# Patient Record
Sex: MALE | Race: WHITE | NOT HISPANIC OR LATINO | URBAN - METROPOLITAN AREA
[De-identification: names, ages, dates, MRNs, and addresses within clinical notes are randomized per-mention and may not be internally consistent; named-entity substitution may affect disease eponyms.]

---

## 2017-11-03 ENCOUNTER — EMERGENCY (EMERGENCY)
Facility: HOSPITAL | Age: 57
LOS: 0 days | Discharge: HOME | End: 2017-11-03
Attending: STUDENT IN AN ORGANIZED HEALTH CARE EDUCATION/TRAINING PROGRAM
Payer: MEDICARE

## 2017-11-03 VITALS
HEART RATE: 79 BPM | RESPIRATION RATE: 20 BRPM | DIASTOLIC BLOOD PRESSURE: 68 MMHG | SYSTOLIC BLOOD PRESSURE: 108 MMHG | TEMPERATURE: 98 F | OXYGEN SATURATION: 96 %

## 2017-11-03 VITALS
HEIGHT: 67 IN | RESPIRATION RATE: 18 BRPM | HEART RATE: 88 BPM | TEMPERATURE: 98 F | DIASTOLIC BLOOD PRESSURE: 75 MMHG | WEIGHT: 220.02 LBS | OXYGEN SATURATION: 99 % | SYSTOLIC BLOOD PRESSURE: 109 MMHG

## 2017-11-03 LAB
ALBUMIN SERPL ELPH-MCNC: 4.1 G/DL — SIGNIFICANT CHANGE UP (ref 3.3–5)
ALP SERPL-CCNC: 72 U/L — SIGNIFICANT CHANGE UP (ref 40–120)
ALT FLD-CCNC: 42 U/L — SIGNIFICANT CHANGE UP (ref 12–78)
ANION GAP SERPL CALC-SCNC: 12 MMOL/L — SIGNIFICANT CHANGE UP (ref 5–17)
APTT BLD: 30.6 SEC — SIGNIFICANT CHANGE UP (ref 27.5–37.4)
AST SERPL-CCNC: 33 U/L — SIGNIFICANT CHANGE UP (ref 15–37)
BASOPHILS # BLD AUTO: 0 K/UL — SIGNIFICANT CHANGE UP (ref 0–0.2)
BASOPHILS NFR BLD AUTO: 0.6 % — SIGNIFICANT CHANGE UP (ref 0–2)
BILIRUB SERPL-MCNC: 0.5 MG/DL — SIGNIFICANT CHANGE UP (ref 0.2–1.2)
BUN SERPL-MCNC: 7 MG/DL — SIGNIFICANT CHANGE UP (ref 7–23)
CALCIUM SERPL-MCNC: 8.8 MG/DL — SIGNIFICANT CHANGE UP (ref 8.5–10.1)
CHLORIDE SERPL-SCNC: 94 MMOL/L — LOW (ref 96–108)
CK MB BLD-MCNC: 1.6 % — SIGNIFICANT CHANGE UP (ref 0–3.5)
CK MB BLD-MCNC: 2.1 % — SIGNIFICANT CHANGE UP (ref 0–3.5)
CK MB CFR SERPL CALC: 4.7 NG/ML — HIGH (ref 0.5–3.6)
CK MB CFR SERPL CALC: 7.1 NG/ML — HIGH (ref 0.5–3.6)
CK SERPL-CCNC: 298 U/L — SIGNIFICANT CHANGE UP (ref 26–308)
CK SERPL-CCNC: 335 U/L — HIGH (ref 26–308)
CO2 SERPL-SCNC: 24 MMOL/L — SIGNIFICANT CHANGE UP (ref 22–31)
CREAT SERPL-MCNC: 0.7 MG/DL — SIGNIFICANT CHANGE UP (ref 0.5–1.3)
EOSINOPHIL # BLD AUTO: 0 K/UL — SIGNIFICANT CHANGE UP (ref 0–0.5)
EOSINOPHIL NFR BLD AUTO: 0.7 % — SIGNIFICANT CHANGE UP (ref 0–6)
GLUCOSE SERPL-MCNC: 119 MG/DL — HIGH (ref 70–99)
HCT VFR BLD CALC: 37.2 % — LOW (ref 39–50)
HGB BLD-MCNC: 13.4 G/DL — SIGNIFICANT CHANGE UP (ref 13–17)
INR BLD: 1 RATIO — SIGNIFICANT CHANGE UP (ref 0.88–1.16)
LYMPHOCYTES # BLD AUTO: 1.4 K/UL — SIGNIFICANT CHANGE UP (ref 1–3.3)
LYMPHOCYTES # BLD AUTO: 21 % — SIGNIFICANT CHANGE UP (ref 13–44)
MCHC RBC-ENTMCNC: 31.5 PG — SIGNIFICANT CHANGE UP (ref 27–34)
MCHC RBC-ENTMCNC: 36 GM/DL — SIGNIFICANT CHANGE UP (ref 32–36)
MCV RBC AUTO: 87.7 FL — SIGNIFICANT CHANGE UP (ref 80–100)
MONOCYTES # BLD AUTO: 0.4 K/UL — SIGNIFICANT CHANGE UP (ref 0–0.9)
MONOCYTES NFR BLD AUTO: 6.5 % — SIGNIFICANT CHANGE UP (ref 2–14)
NEUTROPHILS # BLD AUTO: 4.9 K/UL — SIGNIFICANT CHANGE UP (ref 1.8–7.4)
NEUTROPHILS NFR BLD AUTO: 71.1 % — SIGNIFICANT CHANGE UP (ref 43–77)
PLATELET # BLD AUTO: 188 K/UL — SIGNIFICANT CHANGE UP (ref 150–400)
POTASSIUM SERPL-MCNC: 3.9 MMOL/L — SIGNIFICANT CHANGE UP (ref 3.5–5.3)
POTASSIUM SERPL-SCNC: 3.9 MMOL/L — SIGNIFICANT CHANGE UP (ref 3.5–5.3)
PROT SERPL-MCNC: 7 GM/DL — SIGNIFICANT CHANGE UP (ref 6–8.3)
PROTHROM AB SERPL-ACNC: 10.9 SEC — SIGNIFICANT CHANGE UP (ref 9.8–12.7)
RBC # BLD: 4.24 M/UL — SIGNIFICANT CHANGE UP (ref 4.2–5.8)
RBC # FLD: 11.8 % — SIGNIFICANT CHANGE UP (ref 11–15)
SODIUM SERPL-SCNC: 130 MMOL/L — LOW (ref 135–145)
TROPONIN I SERPL-MCNC: <.015 NG/ML — SIGNIFICANT CHANGE UP (ref 0.01–0.04)
TROPONIN I SERPL-MCNC: <.015 NG/ML — SIGNIFICANT CHANGE UP (ref 0.01–0.04)
WBC # BLD: 6.8 K/UL — SIGNIFICANT CHANGE UP (ref 3.8–10.5)
WBC # FLD AUTO: 6.8 K/UL — SIGNIFICANT CHANGE UP (ref 3.8–10.5)

## 2017-11-03 PROCEDURE — 93010 ELECTROCARDIOGRAM REPORT: CPT

## 2017-11-03 PROCEDURE — 99284 EMERGENCY DEPT VISIT MOD MDM: CPT

## 2017-11-03 PROCEDURE — 71010: CPT | Mod: 26

## 2017-11-03 RX ORDER — NITROGLYCERIN 6.5 MG
0.4 CAPSULE, EXTENDED RELEASE ORAL
Qty: 0 | Refills: 0 | Status: DISCONTINUED | OUTPATIENT
Start: 2017-11-03 | End: 2017-11-03

## 2017-11-03 RX ORDER — ASPIRIN/CALCIUM CARB/MAGNESIUM 324 MG
162 TABLET ORAL ONCE
Qty: 0 | Refills: 0 | Status: COMPLETED | OUTPATIENT
Start: 2017-11-03 | End: 2017-11-03

## 2017-11-03 RX ORDER — SODIUM CHLORIDE 9 MG/ML
3 INJECTION INTRAMUSCULAR; INTRAVENOUS; SUBCUTANEOUS ONCE
Qty: 0 | Refills: 0 | Status: COMPLETED | OUTPATIENT
Start: 2017-11-03 | End: 2017-11-03

## 2017-11-03 RX ORDER — ASPIRIN/CALCIUM CARB/MAGNESIUM 324 MG
162 TABLET ORAL ONCE
Qty: 0 | Refills: 0 | Status: DISCONTINUED | OUTPATIENT
Start: 2017-11-03 | End: 2017-11-03

## 2017-11-03 RX ORDER — SODIUM CHLORIDE 9 MG/ML
1000 INJECTION INTRAMUSCULAR; INTRAVENOUS; SUBCUTANEOUS
Qty: 0 | Refills: 0 | Status: DISCONTINUED | OUTPATIENT
Start: 2017-11-03 | End: 2017-11-03

## 2017-11-03 RX ORDER — CLONAZEPAM 1 MG
2 TABLET ORAL ONCE
Qty: 0 | Refills: 0 | Status: DISCONTINUED | OUTPATIENT
Start: 2017-11-03 | End: 2017-11-03

## 2017-11-03 RX ADMIN — Medication 0.4 MILLIGRAM(S): at 10:25

## 2017-11-03 RX ADMIN — Medication 2 MILLIGRAM(S): at 11:51

## 2017-11-03 RX ADMIN — Medication 0.4 MILLIGRAM(S): at 11:16

## 2017-11-03 RX ADMIN — SODIUM CHLORIDE 3 MILLILITER(S): 9 INJECTION INTRAMUSCULAR; INTRAVENOUS; SUBCUTANEOUS at 10:38

## 2017-11-03 RX ADMIN — Medication 162 MILLIGRAM(S): at 10:38

## 2017-11-03 RX ADMIN — SODIUM CHLORIDE 75 MILLILITER(S): 9 INJECTION INTRAMUSCULAR; INTRAVENOUS; SUBCUTANEOUS at 10:46

## 2017-11-03 NOTE — ED PROVIDER NOTE - PROGRESS NOTE DETAILS
pt's PMD (Dr. 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

## 2017-11-03 NOTE — ED ADULT TRIAGE NOTE - CHIEF COMPLAINT QUOTE
ems states, " Pt was picked up from outpatient psych c/o chest pain while sitting in chair this morning, no radiation, 325mg asa given , and sublingual nitro " ekg N/S by ems

## 2017-11-03 NOTE — ED ADULT NURSE NOTE - OBJECTIVE STATEMENT
pt c/o left side chest pain nonradiating while at psychiatrist office. pt also c/o sob, dizziness, given nitro SL x1, asp x 2 by emt.

## 2017-11-03 NOTE — ED PROVIDER NOTE - OBJECTIVE STATEMENT
56 year old male with h/o HTN, DM, HLD, COPD ОЛЬГА, anxiety, schizophrenia, CAD (s/p cardiac stents x4 two years ago), PPM (placed two years ago and Tourettes presents today BIBA for chest pain, pt states that he just left his psychiatrist when he began to feel left sided nonradiating squeezing/ pressure pain rated 8-9/10 associated with nausea and lightheadedness (-) vomiting (-) sob (-) palpitations (-) weakness

## 2017-11-03 NOTE — ED PROVIDER NOTE - PMH
Anxiety    Chronic bronchitis    COPD (chronic obstructive pulmonary disease)    DM (diabetes mellitus)  resolved with weight loss  GERD (gastroesophageal reflux disease)    HLD (hyperlipidemia)    Hypertriglyceridemia    ОЛЬГА (obstructive sleep apnea)    Schizophrenia    Smoker    Smoker    Tourettes syndrome

## 2017-11-06 DIAGNOSIS — F17.200 NICOTINE DEPENDENCE, UNSPECIFIED, UNCOMPLICATED: ICD-10-CM

## 2017-11-06 DIAGNOSIS — R07.9 CHEST PAIN, UNSPECIFIED: ICD-10-CM

## 2017-11-06 DIAGNOSIS — F41.9 ANXIETY DISORDER, UNSPECIFIED: ICD-10-CM

## 2017-11-06 DIAGNOSIS — G47.33 OBSTRUCTIVE SLEEP APNEA (ADULT) (PEDIATRIC): ICD-10-CM

## 2017-11-06 DIAGNOSIS — E78.5 HYPERLIPIDEMIA, UNSPECIFIED: ICD-10-CM

## 2017-11-06 DIAGNOSIS — F20.9 SCHIZOPHRENIA, UNSPECIFIED: ICD-10-CM

## 2017-11-06 DIAGNOSIS — E11.9 TYPE 2 DIABETES MELLITUS WITHOUT COMPLICATIONS: ICD-10-CM

## 2017-11-06 DIAGNOSIS — E78.1 PURE HYPERGLYCERIDEMIA: ICD-10-CM

## 2017-11-06 DIAGNOSIS — F95.2 TOURETTE'S DISORDER: ICD-10-CM

## 2018-04-01 ENCOUNTER — OUTPATIENT (OUTPATIENT)
Dept: OUTPATIENT SERVICES | Facility: HOSPITAL | Age: 58
LOS: 1 days | End: 2018-04-01
Payer: MEDICAID

## 2018-04-01 PROCEDURE — G9001: CPT

## 2018-04-03 ENCOUNTER — EMERGENCY (EMERGENCY)
Facility: HOSPITAL | Age: 58
LOS: 0 days | Discharge: HOME | End: 2018-04-03
Attending: STUDENT IN AN ORGANIZED HEALTH CARE EDUCATION/TRAINING PROGRAM
Payer: MEDICARE

## 2018-04-03 VITALS
OXYGEN SATURATION: 97 % | DIASTOLIC BLOOD PRESSURE: 87 MMHG | TEMPERATURE: 98 F | HEART RATE: 112 BPM | SYSTOLIC BLOOD PRESSURE: 151 MMHG | RESPIRATION RATE: 17 BRPM

## 2018-04-03 VITALS
RESPIRATION RATE: 16 BRPM | SYSTOLIC BLOOD PRESSURE: 140 MMHG | HEART RATE: 80 BPM | HEIGHT: 67 IN | TEMPERATURE: 98 F | OXYGEN SATURATION: 99 % | WEIGHT: 220.02 LBS | DIASTOLIC BLOOD PRESSURE: 85 MMHG

## 2018-04-03 DIAGNOSIS — Z79.82 LONG TERM (CURRENT) USE OF ASPIRIN: ICD-10-CM

## 2018-04-03 DIAGNOSIS — E11.9 TYPE 2 DIABETES MELLITUS WITHOUT COMPLICATIONS: ICD-10-CM

## 2018-04-03 DIAGNOSIS — F20.9 SCHIZOPHRENIA, UNSPECIFIED: ICD-10-CM

## 2018-04-03 DIAGNOSIS — R06.02 SHORTNESS OF BREATH: ICD-10-CM

## 2018-04-03 DIAGNOSIS — J44.9 CHRONIC OBSTRUCTIVE PULMONARY DISEASE, UNSPECIFIED: ICD-10-CM

## 2018-04-03 DIAGNOSIS — K21.9 GASTRO-ESOPHAGEAL REFLUX DISEASE WITHOUT ESOPHAGITIS: ICD-10-CM

## 2018-04-03 DIAGNOSIS — R07.89 OTHER CHEST PAIN: ICD-10-CM

## 2018-04-03 DIAGNOSIS — R10.13 EPIGASTRIC PAIN: ICD-10-CM

## 2018-04-03 DIAGNOSIS — F95.2 TOURETTE'S DISORDER: ICD-10-CM

## 2018-04-03 DIAGNOSIS — Z87.891 PERSONAL HISTORY OF NICOTINE DEPENDENCE: ICD-10-CM

## 2018-04-03 DIAGNOSIS — F41.9 ANXIETY DISORDER, UNSPECIFIED: ICD-10-CM

## 2018-04-03 DIAGNOSIS — E78.5 HYPERLIPIDEMIA, UNSPECIFIED: ICD-10-CM

## 2018-04-03 DIAGNOSIS — G47.33 OBSTRUCTIVE SLEEP APNEA (ADULT) (PEDIATRIC): ICD-10-CM

## 2018-04-03 DIAGNOSIS — E78.1 PURE HYPERGLYCERIDEMIA: ICD-10-CM

## 2018-04-03 DIAGNOSIS — R11.0 NAUSEA: ICD-10-CM

## 2018-04-03 LAB
ALBUMIN SERPL ELPH-MCNC: 4.2 G/DL — SIGNIFICANT CHANGE UP (ref 3.3–5)
ALP SERPL-CCNC: 70 U/L — SIGNIFICANT CHANGE UP (ref 40–120)
ALT FLD-CCNC: 28 U/L — SIGNIFICANT CHANGE UP (ref 12–78)
ANION GAP SERPL CALC-SCNC: 9 MMOL/L — SIGNIFICANT CHANGE UP (ref 5–17)
APTT BLD: 29.1 SEC — SIGNIFICANT CHANGE UP (ref 27.5–37.4)
AST SERPL-CCNC: 28 U/L — SIGNIFICANT CHANGE UP (ref 15–37)
BASOPHILS # BLD AUTO: 0.02 K/UL — SIGNIFICANT CHANGE UP (ref 0–0.2)
BASOPHILS NFR BLD AUTO: 0.2 % — SIGNIFICANT CHANGE UP (ref 0–2)
BILIRUB SERPL-MCNC: 0.4 MG/DL — SIGNIFICANT CHANGE UP (ref 0.2–1.2)
BUN SERPL-MCNC: 3 MG/DL — LOW (ref 7–23)
CALCIUM SERPL-MCNC: 9 MG/DL — SIGNIFICANT CHANGE UP (ref 8.5–10.1)
CHLORIDE SERPL-SCNC: 104 MMOL/L — SIGNIFICANT CHANGE UP (ref 96–108)
CK MB BLD-MCNC: 1.9 % — SIGNIFICANT CHANGE UP (ref 0–3.5)
CK MB BLD-MCNC: 2.1 % — SIGNIFICANT CHANGE UP (ref 0–3.5)
CK MB CFR SERPL CALC: 4.3 NG/ML — HIGH (ref 0.5–3.6)
CK MB CFR SERPL CALC: 5.7 NG/ML — HIGH (ref 0.5–3.6)
CK SERPL-CCNC: 224 U/L — SIGNIFICANT CHANGE UP (ref 26–308)
CK SERPL-CCNC: 269 U/L — SIGNIFICANT CHANGE UP (ref 26–308)
CO2 SERPL-SCNC: 24 MMOL/L — SIGNIFICANT CHANGE UP (ref 22–31)
CREAT SERPL-MCNC: 0.61 MG/DL — SIGNIFICANT CHANGE UP (ref 0.5–1.3)
EOSINOPHIL # BLD AUTO: 0.01 K/UL — SIGNIFICANT CHANGE UP (ref 0–0.5)
EOSINOPHIL NFR BLD AUTO: 0.1 % — SIGNIFICANT CHANGE UP (ref 0–6)
GLUCOSE SERPL-MCNC: 115 MG/DL — HIGH (ref 70–99)
HCT VFR BLD CALC: 39.8 % — SIGNIFICANT CHANGE UP (ref 39–50)
HGB BLD-MCNC: 14.2 G/DL — SIGNIFICANT CHANGE UP (ref 13–17)
IMM GRANULOCYTES NFR BLD AUTO: 0.2 % — SIGNIFICANT CHANGE UP (ref 0–1.5)
INR BLD: 1.12 RATIO — SIGNIFICANT CHANGE UP (ref 0.88–1.16)
LACTATE SERPL-SCNC: 1.7 MMOL/L — SIGNIFICANT CHANGE UP (ref 0.7–2)
LYMPHOCYTES # BLD AUTO: 1.34 K/UL — SIGNIFICANT CHANGE UP (ref 1–3.3)
LYMPHOCYTES # BLD AUTO: 15.5 % — SIGNIFICANT CHANGE UP (ref 13–44)
MCHC RBC-ENTMCNC: 31.4 PG — SIGNIFICANT CHANGE UP (ref 27–34)
MCHC RBC-ENTMCNC: 35.7 GM/DL — SIGNIFICANT CHANGE UP (ref 32–36)
MCV RBC AUTO: 88.1 FL — SIGNIFICANT CHANGE UP (ref 80–100)
MONOCYTES # BLD AUTO: 0.47 K/UL — SIGNIFICANT CHANGE UP (ref 0–0.9)
MONOCYTES NFR BLD AUTO: 5.4 % — SIGNIFICANT CHANGE UP (ref 2–14)
NEUTROPHILS # BLD AUTO: 6.81 K/UL — SIGNIFICANT CHANGE UP (ref 1.8–7.4)
NEUTROPHILS NFR BLD AUTO: 78.6 % — HIGH (ref 43–77)
NRBC # BLD: 0 /100 WBCS — SIGNIFICANT CHANGE UP (ref 0–0)
PLATELET # BLD AUTO: 156 K/UL — SIGNIFICANT CHANGE UP (ref 150–400)
POTASSIUM SERPL-MCNC: 3.5 MMOL/L — SIGNIFICANT CHANGE UP (ref 3.5–5.3)
POTASSIUM SERPL-SCNC: 3.5 MMOL/L — SIGNIFICANT CHANGE UP (ref 3.5–5.3)
PROT SERPL-MCNC: 7.4 GM/DL — SIGNIFICANT CHANGE UP (ref 6–8.3)
PROTHROM AB SERPL-ACNC: 12.2 SEC — SIGNIFICANT CHANGE UP (ref 9.8–12.7)
RBC # BLD: 4.52 M/UL — SIGNIFICANT CHANGE UP (ref 4.2–5.8)
RBC # FLD: 13.8 % — SIGNIFICANT CHANGE UP (ref 10.3–14.5)
SODIUM SERPL-SCNC: 137 MMOL/L — SIGNIFICANT CHANGE UP (ref 135–145)
TROPONIN I SERPL-MCNC: <.015 NG/ML — SIGNIFICANT CHANGE UP (ref 0.01–0.04)
TROPONIN I SERPL-MCNC: <.015 NG/ML — SIGNIFICANT CHANGE UP (ref 0.01–0.04)
WBC # BLD: 8.67 K/UL — SIGNIFICANT CHANGE UP (ref 3.8–10.5)
WBC # FLD AUTO: 8.67 K/UL — SIGNIFICANT CHANGE UP (ref 3.8–10.5)

## 2018-04-03 PROCEDURE — 71045 X-RAY EXAM CHEST 1 VIEW: CPT | Mod: 26

## 2018-04-03 PROCEDURE — 74177 CT ABD & PELVIS W/CONTRAST: CPT | Mod: 26

## 2018-04-03 PROCEDURE — 93010 ELECTROCARDIOGRAM REPORT: CPT

## 2018-04-03 PROCEDURE — 99284 EMERGENCY DEPT VISIT MOD MDM: CPT

## 2018-04-03 RX ORDER — CLONAZEPAM 1 MG
2 TABLET ORAL ONCE
Qty: 0 | Refills: 0 | Status: DISCONTINUED | OUTPATIENT
Start: 2018-04-03 | End: 2018-04-03

## 2018-04-03 RX ORDER — ONDANSETRON 8 MG/1
8 TABLET, FILM COATED ORAL ONCE
Qty: 0 | Refills: 0 | Status: COMPLETED | OUTPATIENT
Start: 2018-04-03 | End: 2018-04-03

## 2018-04-03 RX ORDER — SODIUM CHLORIDE 9 MG/ML
3 INJECTION INTRAMUSCULAR; INTRAVENOUS; SUBCUTANEOUS ONCE
Qty: 0 | Refills: 0 | Status: COMPLETED | OUTPATIENT
Start: 2018-04-03 | End: 2018-04-03

## 2018-04-03 RX ADMIN — ONDANSETRON 8 MILLIGRAM(S): 8 TABLET, FILM COATED ORAL at 13:32

## 2018-04-03 RX ADMIN — Medication 2 MILLIGRAM(S): at 11:45

## 2018-04-03 RX ADMIN — SODIUM CHLORIDE 3 MILLILITER(S): 9 INJECTION INTRAMUSCULAR; INTRAVENOUS; SUBCUTANEOUS at 11:02

## 2018-04-03 NOTE — ED PROVIDER NOTE - PROGRESS NOTE DETAILS
dr noland (pt's pmd) did call in and updated on his labs and results, wants pt to follow up with him in the office

## 2018-04-03 NOTE — ED PROVIDER NOTE - OBJECTIVE STATEMENT
57 year old male presents today c/o two days of chest pain which he describes as a left sided, intermittent, nonradiating pain rated 7/10 associated with nausea, sob and palpitations, he was given full dose aspirin by ems, in the ER pt's pain has improved, he does not appear sob , denies palpitations (-) leg edema (-) recent travel

## 2018-04-16 ENCOUNTER — INPATIENT (INPATIENT)
Facility: HOSPITAL | Age: 58
LOS: 1 days | Discharge: HOME | End: 2018-04-18
Attending: HOSPITALIST | Admitting: HOSPITALIST
Payer: MEDICARE

## 2018-04-16 VITALS
OXYGEN SATURATION: 99 % | WEIGHT: 220.02 LBS | HEART RATE: 70 BPM | DIASTOLIC BLOOD PRESSURE: 71 MMHG | TEMPERATURE: 98 F | RESPIRATION RATE: 17 BRPM | SYSTOLIC BLOOD PRESSURE: 157 MMHG | HEIGHT: 67 IN

## 2018-04-16 DIAGNOSIS — R07.2 PRECORDIAL PAIN: ICD-10-CM

## 2018-04-16 DIAGNOSIS — F41.9 ANXIETY DISORDER, UNSPECIFIED: ICD-10-CM

## 2018-04-16 DIAGNOSIS — R69 ILLNESS, UNSPECIFIED: ICD-10-CM

## 2018-04-16 DIAGNOSIS — F20.9 SCHIZOPHRENIA, UNSPECIFIED: ICD-10-CM

## 2018-04-16 DIAGNOSIS — K92.2 GASTROINTESTINAL HEMORRHAGE, UNSPECIFIED: ICD-10-CM

## 2018-04-16 DIAGNOSIS — E78.1 PURE HYPERGLYCERIDEMIA: ICD-10-CM

## 2018-04-16 LAB
ALBUMIN SERPL ELPH-MCNC: 4 G/DL — SIGNIFICANT CHANGE UP (ref 3.3–5)
ALP SERPL-CCNC: 70 U/L — SIGNIFICANT CHANGE UP (ref 40–120)
ALT FLD-CCNC: 24 U/L — SIGNIFICANT CHANGE UP (ref 12–78)
ANION GAP SERPL CALC-SCNC: 9 MMOL/L — SIGNIFICANT CHANGE UP (ref 5–17)
APTT BLD: 29.6 SEC — SIGNIFICANT CHANGE UP (ref 27.5–37.4)
AST SERPL-CCNC: 23 U/L — SIGNIFICANT CHANGE UP (ref 15–37)
BASOPHILS # BLD AUTO: 0.02 K/UL — SIGNIFICANT CHANGE UP (ref 0–0.2)
BASOPHILS NFR BLD AUTO: 0.3 % — SIGNIFICANT CHANGE UP (ref 0–2)
BILIRUB SERPL-MCNC: 0.3 MG/DL — SIGNIFICANT CHANGE UP (ref 0.2–1.2)
BLD GP AB SCN SERPL QL: SIGNIFICANT CHANGE UP
BUN SERPL-MCNC: 4 MG/DL — LOW (ref 7–23)
CALCIUM SERPL-MCNC: 8.5 MG/DL — SIGNIFICANT CHANGE UP (ref 8.5–10.1)
CHLORIDE SERPL-SCNC: 105 MMOL/L — SIGNIFICANT CHANGE UP (ref 96–108)
CK MB BLD-MCNC: 2.4 % — SIGNIFICANT CHANGE UP (ref 0–3.5)
CK MB CFR SERPL CALC: 3.4 NG/ML — SIGNIFICANT CHANGE UP (ref 0.5–3.6)
CK SERPL-CCNC: 141 U/L — SIGNIFICANT CHANGE UP (ref 26–308)
CO2 SERPL-SCNC: 25 MMOL/L — SIGNIFICANT CHANGE UP (ref 22–31)
CREAT SERPL-MCNC: 0.74 MG/DL — SIGNIFICANT CHANGE UP (ref 0.5–1.3)
EOSINOPHIL # BLD AUTO: 0.04 K/UL — SIGNIFICANT CHANGE UP (ref 0–0.5)
EOSINOPHIL NFR BLD AUTO: 0.6 % — SIGNIFICANT CHANGE UP (ref 0–6)
GLUCOSE SERPL-MCNC: 116 MG/DL — HIGH (ref 70–99)
HCT VFR BLD CALC: 39.2 % — SIGNIFICANT CHANGE UP (ref 39–50)
HGB BLD-MCNC: 13.2 G/DL — SIGNIFICANT CHANGE UP (ref 13–17)
IMM GRANULOCYTES NFR BLD AUTO: 0.2 % — SIGNIFICANT CHANGE UP (ref 0–1.5)
INR BLD: 1.04 RATIO — SIGNIFICANT CHANGE UP (ref 0.88–1.16)
LYMPHOCYTES # BLD AUTO: 1.05 K/UL — SIGNIFICANT CHANGE UP (ref 1–3.3)
LYMPHOCYTES # BLD AUTO: 15.8 % — SIGNIFICANT CHANGE UP (ref 13–44)
MCHC RBC-ENTMCNC: 30.9 PG — SIGNIFICANT CHANGE UP (ref 27–34)
MCHC RBC-ENTMCNC: 33.7 GM/DL — SIGNIFICANT CHANGE UP (ref 32–36)
MCV RBC AUTO: 91.8 FL — SIGNIFICANT CHANGE UP (ref 80–100)
MONOCYTES # BLD AUTO: 0.25 K/UL — SIGNIFICANT CHANGE UP (ref 0–0.9)
MONOCYTES NFR BLD AUTO: 3.8 % — SIGNIFICANT CHANGE UP (ref 2–14)
NEUTROPHILS # BLD AUTO: 5.29 K/UL — SIGNIFICANT CHANGE UP (ref 1.8–7.4)
NEUTROPHILS NFR BLD AUTO: 79.3 % — HIGH (ref 43–77)
NRBC # BLD: 0 /100 WBCS — SIGNIFICANT CHANGE UP (ref 0–0)
OB PNL STL: NEGATIVE — SIGNIFICANT CHANGE UP
PLATELET # BLD AUTO: 142 K/UL — LOW (ref 150–400)
POTASSIUM SERPL-MCNC: 3.6 MMOL/L — SIGNIFICANT CHANGE UP (ref 3.5–5.3)
POTASSIUM SERPL-SCNC: 3.6 MMOL/L — SIGNIFICANT CHANGE UP (ref 3.5–5.3)
PROT SERPL-MCNC: 6.6 GM/DL — SIGNIFICANT CHANGE UP (ref 6–8.3)
PROTHROM AB SERPL-ACNC: 11.4 SEC — SIGNIFICANT CHANGE UP (ref 9.8–12.7)
RBC # BLD: 4.27 M/UL — SIGNIFICANT CHANGE UP (ref 4.2–5.8)
RBC # FLD: 13.7 % — SIGNIFICANT CHANGE UP (ref 10.3–14.5)
SODIUM SERPL-SCNC: 139 MMOL/L — SIGNIFICANT CHANGE UP (ref 135–145)
TROPONIN I SERPL-MCNC: <.015 NG/ML — SIGNIFICANT CHANGE UP (ref 0.01–0.04)
WBC # BLD: 6.66 K/UL — SIGNIFICANT CHANGE UP (ref 3.8–10.5)
WBC # FLD AUTO: 6.66 K/UL — SIGNIFICANT CHANGE UP (ref 3.8–10.5)

## 2018-04-16 PROCEDURE — 99285 EMERGENCY DEPT VISIT HI MDM: CPT

## 2018-04-16 PROCEDURE — 93010 ELECTROCARDIOGRAM REPORT: CPT

## 2018-04-16 PROCEDURE — 71045 X-RAY EXAM CHEST 1 VIEW: CPT | Mod: 26

## 2018-04-16 PROCEDURE — 99223 1ST HOSP IP/OBS HIGH 75: CPT

## 2018-04-16 RX ORDER — CITALOPRAM 10 MG/1
40 TABLET, FILM COATED ORAL DAILY
Qty: 0 | Refills: 0 | Status: DISCONTINUED | OUTPATIENT
Start: 2018-04-16 | End: 2018-04-18

## 2018-04-16 RX ORDER — ONDANSETRON 8 MG/1
8 TABLET, FILM COATED ORAL ONCE
Qty: 0 | Refills: 0 | Status: COMPLETED | OUTPATIENT
Start: 2018-04-16 | End: 2018-04-16

## 2018-04-16 RX ORDER — INFLUENZA VIRUS VACCINE 15; 15; 15; 15 UG/.5ML; UG/.5ML; UG/.5ML; UG/.5ML
0.5 SUSPENSION INTRAMUSCULAR ONCE
Qty: 0 | Refills: 0 | Status: COMPLETED | OUTPATIENT
Start: 2018-04-16 | End: 2018-04-18

## 2018-04-16 RX ORDER — ONDANSETRON 8 MG/1
4 TABLET, FILM COATED ORAL EVERY 6 HOURS
Qty: 0 | Refills: 0 | Status: DISCONTINUED | OUTPATIENT
Start: 2018-04-16 | End: 2018-04-18

## 2018-04-16 RX ORDER — CLONAZEPAM 1 MG
2 TABLET ORAL THREE TIMES A DAY
Qty: 0 | Refills: 0 | Status: DISCONTINUED | OUTPATIENT
Start: 2018-04-16 | End: 2018-04-18

## 2018-04-16 RX ORDER — RISPERIDONE 4 MG/1
2 TABLET ORAL AT BEDTIME
Qty: 0 | Refills: 0 | Status: DISCONTINUED | OUTPATIENT
Start: 2018-04-16 | End: 2018-04-18

## 2018-04-16 RX ORDER — TICAGRELOR 90 MG/1
90 TABLET ORAL
Qty: 0 | Refills: 0 | Status: DISCONTINUED | OUTPATIENT
Start: 2018-04-16 | End: 2018-04-18

## 2018-04-16 RX ORDER — PANTOPRAZOLE SODIUM 20 MG/1
40 TABLET, DELAYED RELEASE ORAL EVERY 12 HOURS
Qty: 0 | Refills: 0 | Status: DISCONTINUED | OUTPATIENT
Start: 2018-04-16 | End: 2018-04-18

## 2018-04-16 RX ORDER — ISOSORBIDE MONONITRATE 60 MG/1
60 TABLET, EXTENDED RELEASE ORAL DAILY
Qty: 0 | Refills: 0 | Status: DISCONTINUED | OUTPATIENT
Start: 2018-04-16 | End: 2018-04-18

## 2018-04-16 RX ORDER — ATORVASTATIN CALCIUM 80 MG/1
40 TABLET, FILM COATED ORAL AT BEDTIME
Qty: 0 | Refills: 0 | Status: DISCONTINUED | OUTPATIENT
Start: 2018-04-16 | End: 2018-04-18

## 2018-04-16 RX ADMIN — RISPERIDONE 2 MILLIGRAM(S): 4 TABLET ORAL at 22:53

## 2018-04-16 RX ADMIN — PANTOPRAZOLE SODIUM 40 MILLIGRAM(S): 20 TABLET, DELAYED RELEASE ORAL at 18:08

## 2018-04-16 RX ADMIN — Medication 10 MILLIGRAM(S): at 14:00

## 2018-04-16 RX ADMIN — Medication 2 MILLIGRAM(S): at 21:47

## 2018-04-16 RX ADMIN — ONDANSETRON 4 MILLIGRAM(S): 8 TABLET, FILM COATED ORAL at 14:10

## 2018-04-16 RX ADMIN — Medication 2 MILLIGRAM(S): at 13:58

## 2018-04-16 RX ADMIN — ATORVASTATIN CALCIUM 40 MILLIGRAM(S): 80 TABLET, FILM COATED ORAL at 21:47

## 2018-04-16 RX ADMIN — TICAGRELOR 90 MILLIGRAM(S): 90 TABLET ORAL at 18:08

## 2018-04-16 RX ADMIN — ONDANSETRON 8 MILLIGRAM(S): 8 TABLET, FILM COATED ORAL at 09:11

## 2018-04-16 NOTE — H&P ADULT - PROBLEM SELECTOR PLAN 2
telemetry  cardiology eval   continue brilinta in spite of gastroenterologist bleed as guiac NEGATIVE and hgb within normal limit

## 2018-04-16 NOTE — ED PROVIDER NOTE - GASTROINTESTINAL, MLM
Abdomen soft, non-tender, no guarding. dark stool noted on guaiac - negative on lab testing for blood

## 2018-04-16 NOTE — ED ADULT TRIAGE NOTE - CHIEF COMPLAINT QUOTE
vomiting blood with dark tarry stools since Saturday with chest pressure on and off   history of 4 cardiac stents with pacemaker

## 2018-04-16 NOTE — H&P ADULT - NSHPPHYSICALEXAM_GEN_ALL_CORE
GENERAL: NAD well-developed  HEAD:  Atraumatic, Normocephalic  EYES: EOMI, PERRLA, conjunctiva and sclera clear  ENMT: No tonsillar erythema, exudates, or enlargement; Moist mucous membranes, Good dentition, No lesions  NECK: Supple, No JVD, Normal thyroid  NERVOUS SYSTEM:  Alert & Oriented X3, Good concentration; Motor Strength 5/5 B/L upper and lower extremities; DTRs 2+ intact and symmetric  CHEST/LUNG: Clear to percussion bilaterally; No rales, rhonchi, wheezing, or rubs  HEART: Regular rate and rhythm; No murmurs, rubs, or gallops  ABDOMEN: Soft, Nontender, Nondistended; Bowel sounds present  EXTREMITIES:  2+ Peripheral Pulses, No clubbing, cyanosis, or edema  LYMPH: No lymphadenopathy   SKIN: No rashes or lesions GENERAL: NAD well-developed  HEAD:  Atraumatic, Normocephalic  EYES: EOMI, PERRLA, conjunctiva and sclera clear  ENMT: No tonsillar erythema, exudates, or enlargement; Moist mucous membranes, Good dentition, No lesions  NECK: Supple, No JVD, Normal thyroid  NERVOUS SYSTEM:  Alert & Oriented X3, Good concentration; Motor Strength 5/5 B/L upper and lower extremities; DTRs 2+ intact and symmetric  CHEST/LUNG: Clear to percussion bilaterally; No rales, rhonchi, wheezing, or rubs  HEART: Regular rate and rhythm; No murmurs, rubs, or gallops  ABDOMEN:mild upper epigastric tender, Nondistended; Bowel sounds present  EXTREMITIES:  2+ Peripheral Pulses, No clubbing, cyanosis, or edema  LYMPH: No lymphadenopathy   SKIN: No rashes or lesions

## 2018-04-16 NOTE — PATIENT PROFILE ADULT. - NS PRO TALK SOMEONE YN
Lazy S Intermediate Repair Preamble Text (Leave Blank If You Do Not Want): Undermining was performed with blunt dissection. Consent (Scalp)/Introductory Paragraph: The rationale for Mohs was explained to the patient and consent was obtained. The risks, benefits and alternatives to therapy were discussed in detail. Specifically, the risks of changes in hair growth pattern secondary to repair, infection, scarring, bleeding, prolonged wound healing, incomplete removal, allergy to anesthesia, nerve injury and recurrence were addressed. Prior to the procedure, the treatment site was clearly identified and confirmed by the patient. All components of Universal Protocol/PAUSE Rule completed. Tissue Cultured Epidermal Autograft Text: The defect edges were debeveled with a #15 scalpel blade.  Given the location of the defect, shape of the defect and the proximity to free margins a tissue cultured epidermal autograft was deemed most appropriate.  The graft was then trimmed to fit the size of the defect.  The graft was then placed in the primary defect and oriented appropriately. Surgeon/Pathologist Verbiage (Will Incorporate Name Of Surgeon From Intro If Not Blank): operated in two distinct and integrated capacities as the surgeon and pathologist. Keystone Flap Text: The defect edges were debeveled with a #15 scalpel blade.  Given the location of the defect, shape of the defect a keystone flap was deemed most appropriate.  Using a sterile surgical marker, an appropriate keystone flap was drawn incorporating the defect, outlining the appropriate donor tissue and placing the expected incisions within the relaxed skin tension lines where possible. The area thus outlined was incised deep to adipose tissue with a #15 scalpel blade.  The skin margins were undermined to an appropriate distance in all directions around the primary defect and laterally outward around the flap utilizing iris scissors. Stage 8: Additional Anesthesia Type: 1% lidocaine with epinephrine Spiral Flap Text: The defect edges were debeveled with a #15 scalpel blade.  Given the location of the defect, shape of the defect and the proximity to free margins a spiral flap was deemed most appropriate.  Using a sterile surgical marker, an appropriate rotation flap was drawn incorporating the defect and placing the expected incisions within the relaxed skin tension lines where possible. The area thus outlined was incised deep to adipose tissue with a #15 scalpel blade.  The skin margins were undermined to an appropriate distance in all directions utilizing iris scissors. Stage 5: Additional Anesthesia Volume In Cc: 0 Consent 2/Introductory Paragraph: Mohs surgery was explained to the patient and consent was obtained. The risks, benefits and alternatives to therapy were discussed in detail. Specifically, the risks of infection, scarring, bleeding, prolonged wound healing, incomplete removal, allergy to anesthesia, nerve injury and recurrence were addressed. Prior to the procedure, the treatment site was clearly identified and confirmed by the patient. All components of Universal Protocol/PAUSE Rule completed. Epidermal Sutures: 4-0 Nylon Suture Removal: 14 days Consent (Spinal Accessory)/Introductory Paragraph: The rationale for Mohs was explained to the patient and consent was obtained. The risks, benefits and alternatives to therapy were discussed in detail. Specifically, the risks of damage to the spinal accessory nerve, infection, scarring, bleeding, prolonged wound healing, incomplete removal, allergy to anesthesia, and recurrence were addressed. Prior to the procedure, the treatment site was clearly identified and confirmed by the patient. All components of Universal Protocol/PAUSE Rule completed. Quadrant Reporting?: no Closure 3 Information: This tab is for additional flaps and grafts above and beyond our usual structured repairs.  Please note if you enter information here it will not currently bill and you will need to add the billing information manually. Unna Boot Text: An Unna boot was placed to help immobilize the limb and facilitate more rapid healing. Posterior Auricular Interpolation Flap Text: A decision was made to reconstruct the defect utilizing an interpolation axial flap and a staged reconstruction.  A telfa template was made of the defect.  This telfa template was then used to outline the posterior auricular interpolation flap.  The donor area for the pedicle flap was then injected with anesthesia.  The flap was excised through the skin and subcutaneous tissue down to the layer of the underlying musculature.  The pedicle flap was carefully excised within this deep plane to maintain its blood supply.  The edges of the donor site were undermined.   The donor site was closed in a primary fashion.  The pedicle was then rotated into position and sutured.  Once the tube was sutured into place, adequate blood supply was confirmed with blanching and refill.  The pedicle was then wrapped with xeroform gauze and dressed appropriately with a telfa and gauze bandage to ensure continued blood supply and protect the attached pedicle. Modified Advancement Flap Text: The defect edges were debeveled with a #15 scalpel blade.  Given the location of the defect, shape of the defect and the proximity to free margins a modified advancement flap was deemed most appropriate.  Using a sterile surgical marker, an appropriate advancement flap was drawn incorporating the defect and placing the expected incisions within the relaxed skin tension lines where possible.    The area thus outlined was incised deep to adipose tissue with a #15 scalpel blade.  The skin margins were undermined to an appropriate distance in all directions utilizing iris scissors. S Plasty Text: Given the location and shape of the defect, and the orientation of relaxed skin tension lines, an S-plasty was deemed most appropriate for repair.  Using a sterile surgical marker, the appropriate outline of the S-plasty was drawn, incorporating the defect and placing the expected incisions within the relaxed skin tension lines where possible.  The area thus outlined was incised deep to adipose tissue with a #15 scalpel blade.  The skin margins were undermined to an appropriate distance in all directions utilizing iris scissors. The skin flaps were advanced over the defect.  The opposing margins were then approximated with interrupted buried subcutaneous sutures. Composite Graft Text: The defect edges were debeveled with a #15 scalpel blade.  Given the location of the defect, shape of the defect, the proximity to free margins and the fact the defect was full thickness a composite graft was deemed most appropriate.  The defect was outline and then transferred to the donor site.  A full thickness graft was then excised from the donor site. The graft was then placed in the primary defect, oriented appropriately and then sutured into place.  The secondary defect was then repaired using a primary closure. Simple / Intermediate / Complex Repair - Final Wound Length In Cm: 6 Number Of Stages: 1 no Estimated Blood Loss (Cc): minimal Purse String (Intermediate) Text: Given the location of the defect and the characteristics of the surrounding skin a pursestring intermediate closure was deemed most appropriate.  Undermining was performed circumfirentially around the surgical defect.  A purstring suture was then placed and tightened. Partial Purse String (Intermediate) Text: Given the location of the defect and the characteristics of the surrounding skin an intermediate purse string closure was deemed most appropriate.  Undermining was performed circumfirentially around the surgical defect.  A purse string suture was then placed and tightened. Wound tension only allowed a partial closure of the circular defect. Rhombic Flap Text: The defect edges were debeveled with a #15 scalpel blade.  Given the location of the defect and the proximity to free margins a rhombic flap was deemed most appropriate.  Using a sterile surgical marker, an appropriate rhombic flap was drawn incorporating the defect.    The area thus outlined was incised deep to adipose tissue with a #15 scalpel blade.  The skin margins were undermined to an appropriate distance in all directions utilizing iris scissors. Burow's Advancement Flap Text: The defect edges were debeveled with a #15 scalpel blade.  Given the location of the defect and the proximity to free margins a Burow's advancement flap was deemed most appropriate.  Using a sterile surgical marker, the appropriate advancement flap was drawn incorporating the defect and placing the expected incisions within the relaxed skin tension lines where possible.    The area thus outlined was incised deep to adipose tissue with a #15 scalpel blade.  The skin margins were undermined to an appropriate distance in all directions utilizing iris scissors. Double Island Pedicle Flap Text: The defect edges were debeveled with a #15 scalpel blade.  Given the location of the defect, shape of the defect and the proximity to free margins a double island pedicle advancement flap was deemed most appropriate.  Using a sterile surgical marker, an appropriate advancement flap was drawn incorporating the defect, outlining the appropriate donor tissue and placing the expected incisions within the relaxed skin tension lines where possible.    The area thus outlined was incised deep to adipose tissue with a #15 scalpel blade.  The skin margins were undermined to an appropriate distance in all directions around the primary defect and laterally outward around the island pedicle utilizing iris scissors.  There was minimal undermining beneath the pedicle flap. Anesthesia Volume In Cc: 9 V-Y Flap Text: The defect edges were debeveled with a #15 scalpel blade.  Given the location of the defect, shape of the defect and the proximity to free margins a V-Y flap was deemed most appropriate.  Using a sterile surgical marker, an appropriate advancement flap was drawn incorporating the defect and placing the expected incisions within the relaxed skin tension lines where possible.    The area thus outlined was incised deep to adipose tissue with a #15 scalpel blade.  The skin margins were undermined to an appropriate distance in all directions utilizing iris scissors. Dressing: pressure dressing Melolabial Interpolation Flap Text: A decision was made to reconstruct the defect utilizing an interpolation axial flap and a staged reconstruction.  A telfa template was made of the defect.  This telfa template was then used to outline the melolabial interpolation flap.  The donor area for the pedicle flap was then injected with anesthesia.  The flap was excised through the skin and subcutaneous tissue down to the layer of the underlying musculature.  The pedicle flap was carefully excised within this deep plane to maintain its blood supply.  The edges of the donor site were undermined.   The donor site was closed in a primary fashion.  The pedicle was then rotated into position and sutured.  Once the tube was sutured into place, adequate blood supply was confirmed with blanching and refill.  The pedicle was then wrapped with xeroform gauze and dressed appropriately with a telfa and gauze bandage to ensure continued blood supply and protect the attached pedicle. Area M Indication Text: Tumors in this location are included in Area M (cheek, forehead, scalp, neck, jawline and pretibial skin).  Mohs surgery is indicated for tumors in these anatomic locations. Mauc Instructions: By selecting yes to the question below the MAUC number will be added into the note.  This will be calculated automatically based on the diagnosis chosen, the size entered, the body zone selected (H,M,L) and the specific indications you chose. You will also have the option to override the Mohs AUC if you disagree with the automatically calculated number and this option is found in the Case Summary tab. Consent (Temporal Branch)/Introductory Paragraph: The rationale for Mohs was explained to the patient and consent was obtained. The risks, benefits and alternatives to therapy were discussed in detail. Specifically, the risks of damage to the temporal branch of the facial nerve, infection, scarring, bleeding, prolonged wound healing, incomplete removal, allergy to anesthesia, and recurrence were addressed. Prior to the procedure, the treatment site was clearly identified and confirmed by the patient. All components of Universal Protocol/PAUSE Rule completed. Referred To Oculoplastics For Closure Text (Leave Blank If You Do Not Want): After obtaining clear surgical margins the patient was sent to oculoplastics for surgical repair.  The patient understands they will receive post-surgical care and follow-up from the referring physician's office. Trilobed Flap Text: The defect edges were debeveled with a #15 scalpel blade.  Given the location of the defect and the proximity to free margins a trilobed flap was deemed most appropriate.  Using a sterile surgical marker, an appropriate trilobed flap drawn around the defect.    The area thus outlined was incised deep to adipose tissue with a #15 scalpel blade.  The skin margins were undermined to an appropriate distance in all directions utilizing iris scissors. Post-Care Instructions: Patient instructed to leave dressing alone and dry for 48 hours. Written and verbal instructions provided for the patient along with Dr. Wood's cell phone number. Repair Performed By Another Provider Text (Leave Blank If You Do Not Want): After obtaining clear surgical margins the defect was repaired by another provider. Closure 4 Information: This tab is for additional flaps and grafts above and beyond our usual structured repairs.  Please note if you enter information here it will not currently bill and you will need to add the billing information manually. Manual Repair Warning Statement: We plan on removing the manually selected variable below in favor of our much easier automatic structured text blocks found in the previous tab. We decided to do this to help make the flow better and give you the full power of structured data. Manual selection is never going to be ideal in our platform and I would encourage you to avoid using manual selection from this point on, especially since I will be sunsetting this feature. It is important that you do one of two things with the customized text below. First, you can save all of the text in a word file so you can have it for future reference. Second, transfer the text to the appropriate area in the Library tab. Lastly, if there is a flap or graft type which we do not have you need to let us know right away so I can add it in before the variable is hidden. No need to panic, we plan to give you roughly 6 months to make the change. Mohs Rapid Report Verbiage: The area of clinically evident tumor was marked with skin marking ink and appropriately hatched.  The initial incision was made following the Mohs approach through the skin.  The specimen was taken to the lab, divided into the necessary number of pieces, chromacoded and processed according to the Mohs protocol.  This was repeated in successive stages until a tumor free defect was achieved. H Plasty Text: Given the location of the defect, shape of the defect and the proximity to free margins a H-plasty was deemed most appropriate for repair.  Using a sterile surgical marker, the appropriate advancement arms of the H-plasty were drawn incorporating the defect and placing the expected incisions within the relaxed skin tension lines where possible. The area thus outlined was incised deep to adipose tissue with a #15 scalpel blade. The skin margins were undermined to an appropriate distance in all directions utilizing iris scissors.  The opposing advancement arms were then advanced into place in opposite direction and anchored with interrupted buried subcutaneous sutures. Epidermal Closure Graft Donor Site (Optional): simple interrupted Closure 2 Information: This tab is for additional flaps and grafts, including complex repair and grafts and complex repair and flaps. You can also specify a different location for the additional defect, if the location is the same you do not need to select a new one. We will insert the automated text for the repair you select below just as we do for solitary flaps and grafts. Please note that at this time if you select a location with a different insurance zone you will need to override the ICD10 and CPT if appropriate. Ear Star Wedge Flap Text: The defect edges were debeveled with a #15 blade scalpel.  Given the location of the defect and the proximity to free margins (helical rim) an ear star wedge flap was deemed most appropriate.  Using a sterile surgical marker, the appropriate flap was drawn incorporating the defect and placing the expected incisions between the helical rim and antihelix where possible.  The area thus outlined was incised through and through with a #15 scalpel blade. Purse String (Simple) Text: Given the location of the defect and the characteristics of the surrounding skin a pursestring closure was deemed most appropriate.  Undermining was performed circumfirentially around the surgical defect.  A purstring suture was then placed and tightened. Cheek-To-Nose Interpolation Flap Text: A decision was made to reconstruct the defect utilizing an interpolation axial flap and a staged reconstruction.  A telfa template was made of the defect.  This telfa template was then used to outline the Cheek-To-Nose Interpolation flap.  The donor area for the pedicle flap was then injected with anesthesia.  The flap was excised through the skin and subcutaneous tissue down to the layer of the underlying musculature.  The interpolation flap was carefully excised within this deep plane to maintain its blood supply.  The edges of the donor site were undermined.   The donor site was closed in a primary fashion.  The pedicle was then rotated into position and sutured.  Once the tube was sutured into place, adequate blood supply was confirmed with blanching and refill.  The pedicle was then wrapped with xeroform gauze and dressed appropriately with a telfa and gauze bandage to ensure continued blood supply and protect the attached pedicle. Consent (Ear)/Introductory Paragraph: The rationale for Mohs was explained to the patient and consent was obtained. The risks, benefits and alternatives to therapy were discussed in detail. Specifically, the risks of ear deformity, infection, scarring, bleeding, prolonged wound healing, incomplete removal, allergy to anesthesia, nerve injury and recurrence were addressed. Prior to the procedure, the treatment site was clearly identified and confirmed by the patient. All components of Universal Protocol/PAUSE Rule completed. Deep Sutures: 3-0 Vicryl Hatchet Flap Text: The defect edges were debeveled with a #15 scalpel blade.  Given the location of the defect, shape of the defect and the proximity to free margins a hatchet flap was deemed most appropriate.  Using a sterile surgical marker, an appropriate hatchet flap was drawn incorporating the defect and placing the expected incisions within the relaxed skin tension lines where possible.    The area thus outlined was incised deep to adipose tissue with a #15 scalpel blade.  The skin margins were undermined to an appropriate distance in all directions utilizing iris scissors. Tarsorrhaphy Text: A tarsorrhaphy was performed using Frost sutures. Cheiloplasty (Complex) Text: A decision was made to reconstruct the defect with a  cheiloplasty.  The defect was undermined extensively.  Additional obicularis oris muscle was excised with a 15 blade scalpel.  The defect was converted into a full thickness wedge to facilite a better cosmetic result.  Small vessels were then tied off with 5-0 monocyrl. The obicularis oris, superficial fascia, adipose and dermis were then reapproximated.  After the deeper layers were approximated the epidermis was reapproximated with particular care given to realign the vermillion border. Bcc Infiltrative Histology Text: There were numerous aggregates of basaloid cells demonstrating an infiltrative pattern. Complex Repair Preamble Text (Leave Blank If You Do Not Want): Extensive wide undermining was performed. Anesthesia Type: 0.5% lidocaine with 1:200,000 epinephrine Alar Island Pedicle Flap Text: The defect edges were debeveled with a #15 scalpel blade.  Given the location of the defect, shape of the defect and the proximity to the alar rim an island pedicle advancement flap was deemed most appropriate.  Using a sterile surgical marker, an appropriate advancement flap was drawn incorporating the defect, outlining the appropriate donor tissue and placing the expected incisions within the nasal ala running parallel to the alar rim. The area thus outlined was incised with a #15 scalpel blade.  The skin margins were undermined minimally to an appropriate distance in all directions around the primary defect and laterally outward around the island pedicle utilizing iris scissors.  There was minimal undermining beneath the pedicle flap. Postop Diagnosis: same Alternatives Discussed Intro (Do Not Add Period): I discussed alternative treatments to Mohs surgery and specifically discussed the risks and benefits of Referred To Plastics For Closure Text (Leave Blank If You Do Not Want): After obtaining clear surgical margins the patient was sent to Dr. Rao for surgical repair. Detail Level: Detailed Bilobed Transposition Flap Text: The defect edges were debeveled with a #15 scalpel blade.  Given the location of the defect and the proximity to free margins a bilobed transposition flap was deemed most appropriate.  Using a sterile surgical marker, an appropriate bilobe flap drawn around the defect.    The area thus outlined was incised deep to adipose tissue with a #15 scalpel blade.  The skin margins were undermined to an appropriate distance in all directions utilizing iris scissors. Island Pedicle Flap Text: The defect edges were debeveled with a #15 scalpel blade.  Given the location of the defect, shape of the defect and the proximity to free margins an island pedicle advancement flap was deemed most appropriate.  Using a sterile surgical marker, an appropriate advancement flap was drawn incorporating the defect, outlining the appropriate donor tissue and placing the expected incisions within the relaxed skin tension lines where possible.    The area thus outlined was incised deep to adipose tissue with a #15 scalpel blade.  The skin margins were undermined to an appropriate distance in all directions around the primary defect and laterally outward around the island pedicle utilizing iris scissors.  There was minimal undermining beneath the pedicle flap. O-T Advancement Flap Text: The defect edges were debeveled with a #15 scalpel blade.  Given the location of the defect, shape of the defect and the proximity to free margins an O-T advancement flap was deemed most appropriate.  Using a sterile surgical marker, an appropriate advancement flap was drawn incorporating the defect and placing the expected incisions within the relaxed skin tension lines where possible.    The area thus outlined was incised deep to adipose tissue with a #15 scalpel blade.  The skin margins were undermined to an appropriate distance in all directions utilizing iris scissors. No Residual Tumor Seen Histology Text: There were no malignant cells seen in the sections examined. Split-Thickness Skin Graft Text: The defect edges were debeveled with a #15 scalpel blade.  Given the location of the defect, shape of the defect and the proximity to free margins a split thickness skin graft was deemed most appropriate.  Using a sterile surgical marker, the primary defect shape was transferred to the donor site. The split thickness graft was then harvested.  The skin graft was then placed in the primary defect and oriented appropriately. Referred To Mid-Level For Closure Text (Leave Blank If You Do Not Want): After obtaining clear surgical margins the patient was sent to a mid-level provider for surgical repair.  The patient understands they will receive post-surgical care and follow-up from the mid-level provider. Star Wedge Flap Text: The defect edges were debeveled with a #15 scalpel blade.  Given the location of the defect, shape of the defect and the proximity to free margins a star wedge flap was deemed most appropriate.  Using a sterile surgical marker, an appropriate rotation flap was drawn incorporating the defect and placing the expected incisions within the relaxed skin tension lines where possible. The area thus outlined was incised deep to adipose tissue with a #15 scalpel blade.  The skin margins were undermined to an appropriate distance in all directions utilizing iris scissors. Body Location Override (Optional - Billing Will Still Be Based On Selected Body Map Location If Applicable): Left forearm O-Z Plasty Text: The defect edges were debeveled with a #15 scalpel blade.  Given the location of the defect, shape of the defect and the proximity to free margins an O-Z plasty (double transposition flap) was deemed most appropriate.  Using a sterile surgical marker, the appropriate transposition flaps were drawn incorporating the defect and placing the expected incisions within the relaxed skin tension lines where possible.    The area thus outlined was incised deep to adipose tissue with a #15 scalpel blade.  The skin margins were undermined to an appropriate distance in all directions utilizing iris scissors.  Hemostasis was achieved with electrocautery.  The flaps were then transposed into place, one clockwise and the other counterclockwise, and anchored with interrupted buried subcutaneous sutures. Muscle Hinge Flap Text: The defect edges were debeveled with a #15 scalpel blade.  Given the size, depth and location of the defect and the proximity to free margins a muscle hinge flap was deemed most appropriate.  Using a sterile surgical marker, an appropriate hinge flap was drawn incorporating the defect. The area thus outlined was incised with a #15 scalpel blade.  The skin margins were undermined to an appropriate distance in all directions utilizing iris scissors. Full Thickness Lip Wedge Repair (Flap) Text: Given the location of the defect and the proximity to free margins a full thickness wedge repair was deemed most appropriate.  Using a sterile surgical marker, the appropriate repair was drawn incorporating the defect and placing the expected incisions perpendicular to the vermillion border.  The vermillion border was also meticulously outlined to ensure appropriate reapproximation during the repair.  The area thus outlined was incised through and through with a #15 scalpel blade.  The muscularis and dermis were reaproximated with deep sutures following hemostasis. Care was taken to realign the vermillion border before proceeding with the superficial closure.  Once the vermillion was realigned the superfical and mucosal closure was finished. Repair Type: Intermediate Layered Repair Advancement Flap (Double) Text: The defect edges were debeveled with a #15 scalpel blade.  Given the location of the defect and the proximity to free margins a double advancement flap was deemed most appropriate.  Using a sterile surgical marker, the appropriate advancement flaps were drawn incorporating the defect and placing the expected incisions within the relaxed skin tension lines where possible.    The area thus outlined was incised deep to adipose tissue with a #15 scalpel blade.  The skin margins were undermined to an appropriate distance in all directions utilizing iris scissors. Graft Donor Site Bandage (Optional-Leave Blank If You Don't Want In Note): Steri-strips and a pressure bandage were applied to the donor site. Paramedian Forehead Flap Text: A decision was made to reconstruct the defect utilizing an interpolation axial flap and a staged reconstruction.  A telfa template was made of the defect.  This telfa template was then used to outline the paramedian forehead pedicle flap.  The donor area for the pedicle flap was then injected with anesthesia.  The flap was excised through the skin and subcutaneous tissue down to the layer of the underlying musculature.  The pedicle flap was carefully excised within this deep plane to maintain its blood supply.  The edges of the donor site were undermined.   The donor site was closed in a primary fashion.  The pedicle was then rotated into position and sutured.  Once the tube was sutured into place, adequate blood supply was confirmed with blanching and refill.  The pedicle was then wrapped with xeroform gauze and dressed appropriately with a telfa and gauze bandage to ensure continued blood supply and protect the attached pedicle. Island Pedicle Flap-Requiring Vessel Identification Text: The defect edges were debeveled with a #15 scalpel blade.  Given the location of the defect, shape of the defect and the proximity to free margins an island pedicle advancement flap was deemed most appropriate.  Using a sterile surgical marker, an appropriate advancement flap was drawn, based on the axial vessel mentioned above, incorporating the defect, outlining the appropriate donor tissue and placing the expected incisions within the relaxed skin tension lines where possible.    The area thus outlined was incised deep to adipose tissue with a #15 scalpel blade.  The skin margins were undermined to an appropriate distance in all directions around the primary defect and laterally outward around the island pedicle utilizing iris scissors.  There was minimal undermining beneath the pedicle flap. Biopsy Photograph Reviewed: Yes O-L Flap Text: The defect edges were debeveled with a #15 scalpel blade.  Given the location of the defect, shape of the defect and the proximity to free margins an O-L flap was deemed most appropriate.  Using a sterile surgical marker, an appropriate advancement flap was drawn incorporating the defect and placing the expected incisions within the relaxed skin tension lines where possible.    The area thus outlined was incised deep to adipose tissue with a #15 scalpel blade.  The skin margins were undermined to an appropriate distance in all directions utilizing iris scissors. Mastoid Interpolation Flap Text: A decision was made to reconstruct the defect utilizing an interpolation axial flap and a staged reconstruction.  A telfa template was made of the defect.  This telfa template was then used to outline the mastoid interpolation flap.  The donor area for the pedicle flap was then injected with anesthesia.  The flap was excised through the skin and subcutaneous tissue down to the layer of the underlying musculature.  The pedicle flap was carefully excised within this deep plane to maintain its blood supply.  The edges of the donor site were undermined.   The donor site was closed in a primary fashion.  The pedicle was then rotated into position and sutured.  Once the tube was sutured into place, adequate blood supply was confirmed with blanching and refill.  The pedicle was then wrapped with xeroform gauze and dressed appropriately with a telfa and gauze bandage to ensure continued blood supply and protect the attached pedicle. Primary Defect Length In Cm (Final Defect Size - Required For Flaps/Grafts): 2.2 Advancement-Rotation Flap Text: The defect edges were debeveled with a #15 scalpel blade.  Given the location of the defect, shape of the defect and the proximity to free margins an advancement-rotation flap was deemed most appropriate.  Using a sterile surgical marker, an appropriate flap was drawn incorporating the defect and placing the expected incisions within the relaxed skin tension lines where possible. The area thus outlined was incised deep to adipose tissue with a #15 scalpel blade.  The skin margins were undermined to an appropriate distance in all directions utilizing iris scissors. Epidermal Closure: running horizontal mattress Repair Anesthesia Method: local infiltration Advancement Flap (Single) Text: The defect edges were debeveled with a #15 scalpel blade.  Given the location of the defect and the proximity to free margins a single advancement flap was deemed most appropriate.  Using a sterile surgical marker, an appropriate advancement flap was drawn incorporating the defect and placing the expected incisions within the relaxed skin tension lines where possible.    The area thus outlined was incised deep to adipose tissue with a #15 scalpel blade.  The skin margins were undermined to an appropriate distance in all directions utilizing iris scissors. Skin Substitute Text: The defect edges were debeveled with a #15 scalpel blade.  Given the location of the defect, shape of the defect and the proximity to free margins a skin substitute graft was deemed most appropriate.  The graft material was trimmed to fit the size of the defect. The graft was then placed in the primary defect and oriented appropriately. Surgeon: Dr. Wood Subsequent Stages Histo Method Verbiage: Using a similar technique to that described above, a thin layer of tissue was removed from all areas where tumor was visible on the previous stage.  The tissue was again oriented, mapped, dyed, and processed as above. Melolabial Transposition Flap Text: The defect edges were debeveled with a #15 scalpel blade.  Given the location of the defect and the proximity to free margins a melolabial flap was deemed most appropriate.  Using a sterile surgical marker, an appropriate melolabial transposition flap was drawn incorporating the defect.    The area thus outlined was incised deep to adipose tissue with a #15 scalpel blade.  The skin margins were undermined to an appropriate distance in all directions utilizing iris scissors. V-Y Plasty Text: The defect edges were debeveled with a #15 scalpel blade.  Given the location of the defect, shape of the defect and the proximity to free margins an V-Y advancement flap was deemed most appropriate.  Using a sterile surgical marker, an appropriate advancement flap was drawn incorporating the defect and placing the expected incisions within the relaxed skin tension lines where possible.    The area thus outlined was incised deep to adipose tissue with a #15 scalpel blade.  The skin margins were undermined to an appropriate distance in all directions utilizing iris scissors. Dorsal Nasal Flap Text: The defect edges were debeveled with a #15 scalpel blade.  Given the location of the defect and the proximity to free margins a dorsal nasal flap was deemed most appropriate.  Using a sterile surgical marker, an appropriate dorsal nasal flap was drawn around the defect.    The area thus outlined was incised deep to adipose tissue with a #15 scalpel blade.  The skin margins were undermined to an appropriate distance in all directions utilizing iris scissors. Z Plasty Text: The lesion was extirpated to the level of the fat with a #15 scalpel blade.  Given the location of the defect, shape of the defect and the proximity to free margins a Z-plasty was deemed most appropriate for repair.  Using a sterile surgical marker, the appropriate transposition arms of the Z-plasty were drawn incorporating the defect and placing the expected incisions within the relaxed skin tension lines where possible.    The area thus outlined was incised deep to adipose tissue with a #15 scalpel blade.  The skin margins were undermined to an appropriate distance in all directions utilizing iris scissors.  The opposing transposition arms were then transposed into place in opposite direction and anchored with interrupted buried subcutaneous sutures. Referred To Otolaryngology For Closure Text (Leave Blank If You Do Not Want): After obtaining clear surgical margins the patient was sent to otolaryngology for surgical repair.  The patient understands they will receive post-surgical care and follow-up from the referring physician's office. Consent (Marginal Mandibular)/Introductory Paragraph: The rationale for Mohs was explained to the patient and consent was obtained. The risks, benefits and alternatives to therapy were discussed in detail. Specifically, the risks of damage to the marginal mandibular branch of the facial nerve, infection, scarring, bleeding, prolonged wound healing, incomplete removal, allergy to anesthesia, and recurrence were addressed. Prior to the procedure, the treatment site was clearly identified and confirmed by the patient. All components of Universal Protocol/PAUSE Rule completed. X Size Of Lesion In Cm (Optional): 0.8 Crescentic Advancement Flap Text: The defect edges were debeveled with a #15 scalpel blade.  Given the location of the defect and the proximity to free margins a crescentic advancement flap was deemed most appropriate.  Using a sterile surgical marker, the appropriate advancement flap was drawn incorporating the defect and placing the expected incisions within the relaxed skin tension lines where possible.    The area thus outlined was incised deep to adipose tissue with a #15 scalpel blade.  The skin margins were undermined to an appropriate distance in all directions utilizing iris scissors. Bi-Rhombic Flap Text: The defect edges were debeveled with a #15 scalpel blade.  Given the location of the defect and the proximity to free margins a bi-rhombic flap was deemed most appropriate.  Using a sterile surgical marker, an appropriate rhombic flap was drawn incorporating the defect. The area thus outlined was incised deep to adipose tissue with a #15 scalpel blade.  The skin margins were undermined to an appropriate distance in all directions utilizing iris scissors. Rotation Flap Text: The defect edges were debeveled with a #15 scalpel blade.  Given the location of the defect, shape of the defect and the proximity to free margins a rotation flap was deemed most appropriate.  Using a sterile surgical marker, an appropriate rotation flap was drawn incorporating the defect and placing the expected incisions within the relaxed skin tension lines where possible.    The area thus outlined was incised deep to adipose tissue with a #15 scalpel blade.  The skin margins were undermined to an appropriate distance in all directions utilizing iris scissors. Epidermal Autograft Text: The defect edges were debeveled with a #15 scalpel blade.  Given the location of the defect, shape of the defect and the proximity to free margins an epidermal autograft was deemed most appropriate.  Using a sterile surgical marker, the primary defect shape was transferred to the donor site. The epidermal graft was then harvested.  The skin graft was then placed in the primary defect and oriented appropriately. Eye Protection Verbiage: Before proceeding with the stage, a plastic scleral shield was inserted. The globe was anesthetized with a few drops of 1% lidocaine with 1:100,000 epinephrine. Then, an appropriate sized scleral shield was chosen and coated with lacrilube ointment. The shield was gently inserted and left in place for the duration of each stage. After the stage was completed, the shield was gently removed. Home Suture Removal Text: Patient was provided instructions on removing sutures and will remove their sutures at home.  If they have any questions or difficulties they will call the office. Secondary Intention Text (Leave Blank If You Do Not Want): The defect will heal with secondary intention. Consent (Lip)/Introductory Paragraph: The rationale for Mohs was explained to the patient and consent was obtained. The risks, benefits and alternatives to therapy were discussed in detail. Specifically, the risks of lip deformity, changes in the oral aperture, infection, scarring, bleeding, prolonged wound healing, incomplete removal, allergy to anesthesia, nerve injury and recurrence were addressed. Prior to the procedure, the treatment site was clearly identified and confirmed by the patient. All components of Universal Protocol/PAUSE Rule completed. Mucosal Advancement Flap Text: Given the location of the defect, shape of the defect and the proximity to free margins a mucosal advancement flap was deemed most appropriate. Incisions were made with a 15 blade scalpel in the appropriate fashion along the cutaneous vermillion border and the mucosal lip. The remaining actinically damaged mucosal tissue was excised.  The mucosal advancement flap was then elevated to the gingival sulcus with care taken to preserve the neurovascular structures and advanced into the primary defect. Care was taken to ensure that precise realignment of the vermillion border was achieved. Complex Repair And Flap Additional Text (Will Appearing After The Standard Complex Repair Text): The complex repair was not sufficient to completely close the primary defect. The remaining additional defect was repaired with the flap mentioned below. No Repair - Repaired With Adjacent Surgical Defect Text (Leave Blank If You Do Not Want): After obtaining clear surgical margins the defect was repaired concurrently with another surgical defect which was in close approximation. Bilateral Helical Rim Advancement Flap Text: The defect edges were debeveled with a #15 blade scalpel.  Given the location of the defect and the proximity to free margins (helical rim) a bilateral helical rim advancement flap was deemed most appropriate.  Using a sterile surgical marker, the appropriate advancement flaps were drawn incorporating the defect and placing the expected incisions between the helical rim and antihelix where possible.  The area thus outlined was incised through and through with a #15 scalpel blade.  With a skin hook and iris scissors, the flaps were gently and sharply undermined and freed up. Partial Purse String (Simple) Text: Given the location of the defect and the characteristics of the surrounding skin a simple purse string closure was deemed most appropriate.  Undermining was performed circumfirentially around the surgical defect.  A purse string suture was then placed and tightened. Wound tension only allowed a partial closure of the circular defect. Mohs Method Verbiage: A beveled incision following the standard Mohs approach was done and the specimen was harvested as a microscopic controlled layer. Consent 3/Introductory Paragraph: I gave the patient a chance to ask questions they had about the procedure.  Following this I explained the Mohs procedure and consent was obtained. The risks, benefits and alternatives to therapy were discussed in detail. Specifically, the risks of infection, scarring, bleeding, prolonged wound healing, incomplete removal, allergy to anesthesia, nerve injury and recurrence were addressed. Prior to the procedure, the treatment site was clearly identified and confirmed by the patient. All components of Universal Protocol/PAUSE Rule completed. W Plasty Text: The lesion was extirpated to the level of the fat with a #15 scalpel blade.  Given the location of the defect, shape of the defect and the proximity to free margins a W-plasty was deemed most appropriate for repair.  Using a sterile surgical marker, the appropriate transposition arms of the W-plasty were drawn incorporating the defect and placing the expected incisions within the relaxed skin tension lines where possible.    The area thus outlined was incised deep to adipose tissue with a #15 scalpel blade.  The skin margins were undermined to an appropriate distance in all directions utilizing iris scissors.  The opposing transposition arms were then transposed into place in opposite direction and anchored with interrupted buried subcutaneous sutures. Cheek Interpolation Flap Text: A decision was made to reconstruct the defect utilizing an interpolation axial flap and a staged reconstruction.  A telfa template was made of the defect.  This telfa template was then used to outline the Cheek Interpolation flap.  The donor area for the pedicle flap was then injected with anesthesia.  The flap was excised through the skin and subcutaneous tissue down to the layer of the underlying musculature.  The interpolation flap was carefully excised within this deep plane to maintain its blood supply.  The edges of the donor site were undermined.   The donor site was closed in a primary fashion.  The pedicle was then rotated into position and sutured.  Once the tube was sutured into place, adequate blood supply was confirmed with blanching and refill.  The pedicle was then wrapped with xeroform gauze and dressed appropriately with a telfa and gauze bandage to ensure continued blood supply and protect the attached pedicle. A-T Advancement Flap Text: The defect edges were debeveled with a #15 scalpel blade.  Given the location of the defect, shape of the defect and the proximity to free margins an A-T advancement flap was deemed most appropriate.  Using a sterile surgical marker, an appropriate advancement flap was drawn incorporating the defect and placing the expected incisions within the relaxed skin tension lines where possible.    The area thus outlined was incised deep to adipose tissue with a #15 scalpel blade.  The skin margins were undermined to an appropriate distance in all directions utilizing iris scissors. Primary Defect Width In Cm (Final Defect Size - Required For Flaps/Grafts): 1.8 Ear Wedge Repair Text: A wedge excision was completed by carrying down an excision through the full thickness of the ear and cartilage with an inward facing Burow's triangle. The wound was then closed in a layered fashion. Area L Indication Text: Tumors in this location are included in Area L (trunk and extremities).  Mohs surgery is indicated for larger tumors, or tumors with aggressive histologic features, in these anatomic locations. Consent Type: Consent 1 (Standard) Complex Repair And Graft Additional Text (Will Appearing After The Standard Complex Repair Text): The complex repair was not sufficient to completely close the primary defect. The remaining additional defect was repaired with the graft mentioned below. Same Histology In Subsequent Stages Text: The pattern and morphology of the tumor is as described in the first stage. Hemostasis: Electrocautery Dermal Autograft Text: The defect edges were debeveled with a #15 scalpel blade.  Given the location of the defect, shape of the defect and the proximity to free margins a dermal autograft was deemed most appropriate.  Using a sterile surgical marker, the primary defect shape was transferred to the donor site. The area thus outlined was incised deep to adipose tissue with a #15 scalpel blade.  The harvested graft was then trimmed of adipose and epidermal tissue until only dermis was left.  The skin graft was then placed in the primary defect and oriented appropriately. Cheiloplasty (Less Than 50%) Text: A decision was made to reconstruct the defect with a  cheiloplasty.  The defect was undermined extensively.  Additional obicularis oris muscle was excised with a 15 blade scalpel.  The defect was converted into a full thickness wedge, of less than 50% of the vertical height of the lip, to facilite a better cosmetic result.  Small vessels were then tied off with 5-0 monocyrl. The obicularis oris, superficial fascia, adipose and dermis were then reapproximated.  After the deeper layers were approximated the epidermis was reapproximated with particular care given to realign the vermillion border. Area H Indication Text: Tumors in this location are included in Area H (eyelids, eyebrows, nose, lips, chin, ear, pre-auricular, post-auricular, temple, genitalia, hands, feet, ankles and areola).  Tissue conservation is critical in these anatomic locations. Wound Care: Aquaphor Consent (Nose)/Introductory Paragraph: The rationale for Mohs was explained to the patient and consent was obtained. The risks, benefits and alternatives to therapy were discussed in detail. Specifically, the risks of nasal deformity, changes in the flow of air through the nose, infection, scarring, bleeding, prolonged wound healing, incomplete removal, allergy to anesthesia, nerve injury and recurrence were addressed. Prior to the procedure, the treatment site was clearly identified and confirmed by the patient. All components of Universal Protocol/PAUSE Rule completed. Bcc Histology Text: There were numerous aggregates of basaloid cells. Xenograft Text: The defect edges were debeveled with a #15 scalpel blade.  Given the location of the defect, shape of the defect and the proximity to free margins a xenograft was deemed most appropriate.  The graft was then trimmed to fit the size of the defect.  The graft was then placed in the primary defect and oriented appropriately. Consent (Near Eyelid Margin)/Introductory Paragraph: The rationale for Mohs was explained to the patient and consent was obtained. The risks, benefits and alternatives to therapy were discussed in detail. Specifically, the risks of ectropion or eyelid deformity, infection, scarring, bleeding, prolonged wound healing, incomplete removal, allergy to anesthesia, nerve injury and recurrence were addressed. Prior to the procedure, the treatment site was clearly identified and confirmed by the patient. All components of Universal Protocol/PAUSE Rule completed. Helical Rim Advancement Flap Text: The defect edges were debeveled with a #15 blade scalpel.  Given the location of the defect and the proximity to free margins (helical rim) a double helical rim advancement flap was deemed most appropriate.  Using a sterile surgical marker, the appropriate advancement flaps were drawn incorporating the defect and placing the expected incisions between the helical rim and antihelix where possible.  The area thus outlined was incised through and through with a #15 scalpel blade.  With a skin hook and iris scissors, the flaps were gently and sharply undermined and freed up. Inflammation Suggestive Of Cancer Camouflage Histology Text: There was a dense lymphocytic infiltrate which prevented adequate histologic evaluation of adjacent structures. Interpolation Flap Text: A decision was made to reconstruct the defect utilizing an interpolation axial flap and a staged reconstruction.  A telfa template was made of the defect.  This telfa template was then used to outline the interpolation flap.  The donor area for the pedicle flap was then injected with anesthesia.  The flap was excised through the skin and subcutaneous tissue down to the layer of the underlying musculature.  The interpolation flap was carefully excised within this deep plane to maintain its blood supply.  The edges of the donor site were undermined.   The donor site was closed in a primary fashion.  The pedicle was then rotated into position and sutured.  Once the tube was sutured into place, adequate blood supply was confirmed with blanching and refill.  The pedicle was then wrapped with xeroform gauze and dressed appropriately with a telfa and gauze bandage to ensure continued blood supply and protect the attached pedicle. O-T Plasty Text: The defect edges were debeveled with a #15 scalpel blade.  Given the location of the defect, shape of the defect and the proximity to free margins an O-T plasty was deemed most appropriate.  Using a sterile surgical marker, an appropriate O-T plasty was drawn incorporating the defect and placing the expected incisions within the relaxed skin tension lines where possible.    The area thus outlined was incised deep to adipose tissue with a #15 scalpel blade.  The skin margins were undermined to an appropriate distance in all directions utilizing iris scissors. Mohs Histo Method Verbiage: Each section was then chromacoded and processed in the Mohs lab using the Mohs protocol and submitted for frozen section. Localized Dermabrasion Text: The patient was draped in routine manner.  Localized dermabrasion using 3 x 17 mm wire brush was performed in routine manner to papillary dermis. This spot dermabrasion is being performed to complete skin cancer reconstruction. It also will eliminate the other sun damaged precancerous cells that are known to be part of the regional effect of a lifetime's worth of sun exposure. This localized dermabrasion is therapeutic and should not be considered cosmetic in any regard. Referred To Asc For Closure Text (Leave Blank If You Do Not Want): After obtaining clear surgical margins the patient was sent to an ASC for surgical repair.  The patient understands they will receive post-surgical care and follow-up from the ASC physician. Bilobed Flap Text: The defect edges were debeveled with a #15 scalpel blade.  Given the location of the defect and the proximity to free margins a bilobe flap was deemed most appropriate.  Using a sterile surgical marker, an appropriate bilobe flap drawn around the defect.    The area thus outlined was incised deep to adipose tissue with a #15 scalpel blade.  The skin margins were undermined to an appropriate distance in all directions utilizing iris scissors. Consent 1/Introductory Paragraph: The rationale for Mohs was explained to the patient and consent was obtained. The risks, benefits and alternatives to therapy were discussed in detail. Specifically, the risks of infection, scarring, bleeding, prolonged wound healing, incomplete removal, allergy to anesthesia, nerve injury and recurrence were addressed. Prior to the procedure, the treatment site was clearly identified and confirmed by the patient. All components of Universal Protocol/PAUSE Rule completed. Transposition Flap Text: The defect edges were debeveled with a #15 scalpel blade.  Given the location of the defect and the proximity to free margins a transposition flap was deemed most appropriate.  Using a sterile surgical marker, an appropriate transposition flap was drawn incorporating the defect.    The area thus outlined was incised deep to adipose tissue with a #15 scalpel blade.  The skin margins were undermined to an appropriate distance in all directions utilizing iris scissors. Cartilage Graft Text: The defect edges were debeveled with a #15 scalpel blade.  Given the location of the defect, shape of the defect, the fact the defect involved a full thickness cartilage defect a cartilage graft was deemed most appropriate.  An appropriate donor site was identified, cleansed, and anesthetized. The cartilage graft was then harvested and transferred to the recipient site, oriented appropriately and then sutured into place.  The secondary defect was then repaired using a primary closure. Medical Necessity Statement: Based on my medical judgement, Mohs surgery is the most appropriate treatment for this cancer compared to other treatments. Island Pedicle Flap With Canthal Suspension Text: The defect edges were debeveled with a #15 scalpel blade.  Given the location of the defect, shape of the defect and the proximity to free margins an island pedicle advancement flap was deemed most appropriate.  Using a sterile surgical marker, an appropriate advancement flap was drawn incorporating the defect, outlining the appropriate donor tissue and placing the expected incisions within the relaxed skin tension lines where possible. The area thus outlined was incised deep to adipose tissue with a #15 scalpel blade.  The skin margins were undermined to an appropriate distance in all directions around the primary defect and laterally outward around the island pedicle utilizing iris scissors.  There was minimal undermining beneath the pedicle flap. A suspension suture was placed in the canthal tendon to prevent tension and prevent ectropion.

## 2018-04-16 NOTE — ED PROVIDER NOTE - MEDICAL DECISION MAKING DETAILS
GI bleed noted for past 2 days, guaiac was neg in ED otherwise pt is on Brillinta - will admit for further care with Dr. Najera, Dr. Price to consult.

## 2018-04-16 NOTE — H&P ADULT - NSHPLABSRESULTS_GEN_ALL_CORE
13.2   6.66  )-----------( 142      ( 16 Apr 2018 08:42 )             39.2   04-16    139  |  105  |  4<L>  ----------------------------<  116<H>  3.6   |  25  |  0.74    Ca    8.5      16 Apr 2018 08:42    TPro  6.6  /  Alb  4.0  /  TBili  0.3  /  DBili  x   /  AST  23  /  ALT  24  /  AlkPhos  70  04-16  < from: Xray Chest 1 View AP/PA. (04.03.18 @ 14:29) >    No lung consolidations.

## 2018-04-16 NOTE — ED ADULT NURSE NOTE - OBJECTIVE STATEMENT
Reports having black tarry stools for 3 days, with vomiting, bright blood. Denies abomdinal pain, chest pain, sob, mendoza, cp.

## 2018-04-16 NOTE — H&P ADULT - HISTORY OF PRESENT ILLNESS
57 year old male with PMH of Anxiety/depression- schizophrenia, CAD x 4 stents on Brilinta, HTN, HLD, COPDpresenting to ED due to 2 days of nausea/vomiting and dark tarry stools. Feeling some chest pressure on and off, currently without any chest discomfort. Denies prior GI bleed hx denies any current SOB or weakness. 57 year old male with PMH of Anxiety/depression- schizophrenia, CAD x 4 stents on Brilinta, HTN, HLD, COPDpresenting to ED due to 2 days of nausea/vomiting blood  and dark tarry stools. Feeling some chest pressure on and off, currently without any chest discomfort. patient had a gastric ulcer over 20 years ago

## 2018-04-16 NOTE — H&P ADULT - ASSESSMENT
57m with anxiety disorder with complain of of upper gastroenterologist bleed but with no objective evidence of such     IMPROVE VTE Individual Risk Assessment        RISK                                                          Points  [  ] Previous VTE                                                3  [  ] Thrombophilia                                             2  [  ] Lower limb paralysis                                   2        (unable to hold up >15 seconds)    [  ] Current Cancer                                            2         (within 6 months)  [  ] Immobilization > 24 hrs                              1  [  ] ICU/CCU stay > 24 hours                            1  [  ] Age > 60                                                    1  IMPROVE VTE Score _0________

## 2018-04-16 NOTE — ED PROVIDER NOTE - OBJECTIVE STATEMENT
57 year old male with PMH of Anxiety/depression- schizophrenia, CAD x 4 stents on Brilinta, HTN, HLD, COPDpresenting to ED due to 2 days of nausea/vomiting and dark tarry stools. Feeling some chest pressure on and off, currently without any chest discomfort. Denies prior GI bleed hx denies any current SOB or weakness.

## 2018-04-16 NOTE — CONSULT NOTE ADULT - SUBJECTIVE AND OBJECTIVE BOX
full consult dictated    Plan: Pt with likely UGI bleed secondary to ulcer secondary to ASA and Brilinta.  IV protonix; follow CBC; Zofran

## 2018-04-16 NOTE — H&P ADULT - NSHPREVIEWOFSYSTEMS_GEN_ALL_CORE

## 2018-04-17 LAB
ANION GAP SERPL CALC-SCNC: 6 MMOL/L — SIGNIFICANT CHANGE UP (ref 5–17)
BUN SERPL-MCNC: 5 MG/DL — LOW (ref 7–23)
CALCIUM SERPL-MCNC: 9 MG/DL — SIGNIFICANT CHANGE UP (ref 8.5–10.1)
CHLORIDE SERPL-SCNC: 109 MMOL/L — HIGH (ref 96–108)
CO2 SERPL-SCNC: 28 MMOL/L — SIGNIFICANT CHANGE UP (ref 22–31)
CREAT SERPL-MCNC: 0.62 MG/DL — SIGNIFICANT CHANGE UP (ref 0.5–1.3)
GLUCOSE SERPL-MCNC: 123 MG/DL — HIGH (ref 70–99)
HCT VFR BLD CALC: 43.3 % — SIGNIFICANT CHANGE UP (ref 39–50)
HGB BLD-MCNC: 14.2 G/DL — SIGNIFICANT CHANGE UP (ref 13–17)
MCHC RBC-ENTMCNC: 30.7 PG — SIGNIFICANT CHANGE UP (ref 27–34)
MCHC RBC-ENTMCNC: 32.8 GM/DL — SIGNIFICANT CHANGE UP (ref 32–36)
MCV RBC AUTO: 93.5 FL — SIGNIFICANT CHANGE UP (ref 80–100)
NRBC # BLD: 0 /100 WBCS — SIGNIFICANT CHANGE UP (ref 0–0)
PLATELET # BLD AUTO: 146 K/UL — LOW (ref 150–400)
POTASSIUM SERPL-MCNC: 4 MMOL/L — SIGNIFICANT CHANGE UP (ref 3.5–5.3)
POTASSIUM SERPL-SCNC: 4 MMOL/L — SIGNIFICANT CHANGE UP (ref 3.5–5.3)
RBC # BLD: 4.63 M/UL — SIGNIFICANT CHANGE UP (ref 4.2–5.8)
RBC # FLD: 14 % — SIGNIFICANT CHANGE UP (ref 10.3–14.5)
SODIUM SERPL-SCNC: 143 MMOL/L — SIGNIFICANT CHANGE UP (ref 135–145)
WBC # BLD: 10.62 K/UL — HIGH (ref 3.8–10.5)
WBC # FLD AUTO: 10.62 K/UL — HIGH (ref 3.8–10.5)

## 2018-04-17 PROCEDURE — 99233 SBSQ HOSP IP/OBS HIGH 50: CPT

## 2018-04-17 RX ADMIN — ATORVASTATIN CALCIUM 40 MILLIGRAM(S): 80 TABLET, FILM COATED ORAL at 22:20

## 2018-04-17 RX ADMIN — Medication 2 MILLIGRAM(S): at 05:29

## 2018-04-17 RX ADMIN — Medication 10 MILLIGRAM(S): at 05:30

## 2018-04-17 RX ADMIN — CITALOPRAM 40 MILLIGRAM(S): 10 TABLET, FILM COATED ORAL at 11:56

## 2018-04-17 RX ADMIN — Medication 10 MILLIGRAM(S): at 22:20

## 2018-04-17 RX ADMIN — Medication 2 MILLIGRAM(S): at 14:36

## 2018-04-17 RX ADMIN — RISPERIDONE 2 MILLIGRAM(S): 4 TABLET ORAL at 22:20

## 2018-04-17 RX ADMIN — Medication 10 MILLIGRAM(S): at 14:37

## 2018-04-17 RX ADMIN — PANTOPRAZOLE SODIUM 40 MILLIGRAM(S): 20 TABLET, DELAYED RELEASE ORAL at 05:28

## 2018-04-17 RX ADMIN — PANTOPRAZOLE SODIUM 40 MILLIGRAM(S): 20 TABLET, DELAYED RELEASE ORAL at 17:32

## 2018-04-17 RX ADMIN — TICAGRELOR 90 MILLIGRAM(S): 90 TABLET ORAL at 17:31

## 2018-04-17 RX ADMIN — TICAGRELOR 90 MILLIGRAM(S): 90 TABLET ORAL at 05:29

## 2018-04-17 RX ADMIN — Medication 2 MILLIGRAM(S): at 22:20

## 2018-04-17 NOTE — PROGRESS NOTE ADULT - SUBJECTIVE AND OBJECTIVE BOX
Patient is a 57y old  Male who presents with a chief complaint of blood in stool throwing up blood     INTERVAL HPI/OVERNIGHT EVENTS: new admission overnight     MEDICATIONS  (STANDING):  atorvastatin 40 milliGRAM(s) Oral at bedtime  busPIRone 10 milliGRAM(s) Oral three times a day  citalopram 40 milliGRAM(s) Oral daily  clonazePAM Tablet 2 milliGRAM(s) Oral three times a day  influenza   Vaccine 0.5 milliLiter(s) IntraMuscular once  isosorbide   mononitrate ER Tablet (IMDUR) 60 milliGRAM(s) Oral daily  pantoprazole  Injectable 40 milliGRAM(s) IV Push every 12 hours  risperiDONE   Tablet 2 milliGRAM(s) Oral at bedtime  ticagrelor 90 milliGRAM(s) Oral two times a day    MEDICATIONS  (PRN):  ondansetron Injectable 4 milliGRAM(s) IV Push every 6 hours PRN Nausea      Allergies    No Known Allergies    Intolerances        REVIEW OF SYSTEMS:  CONSTITUTIONAL: No fever, weight loss, or fatigue  EYES: No eye pain, visual disturbances, or discharge  ENMT:  No difficulty hearing, tinnitus, vertigo; No sinus or throat pain  NECK: No pain or stiffness  BREASTS: No pain, masses, or nipple discharge  RESPIRATORY: No cough, wheezing, chills or hemoptysis; No shortness of breath  CARDIOVASCULAR: No chest pain, palpitations, dizziness, or leg swelling  GASTROINTESTINAL: No abdominal or epigastric pain. No nausea, vomiting, or hematemesis; No diarrhea or constipation. No melena or hematochezia.  GENITOURINARY: No dysuria, frequency, hematuria, or incontinence  NEUROLOGICAL: No headaches, memory loss, loss of strength, numbness, or tremors  SKIN: No itching, burning, rashes, or lesions   LYMPH NODES: No enlarged glands  ENDOCRINE: No heat or cold intolerance; No hair loss  MUSCULOSKELETAL: No joint pain or swelling; No muscle, back, or extremity pain  PSYCHIATRIC: No depression, anxiety, mood swings, or difficulty sleeping  HEME/LYMPH: No easy bruising, or bleeding gums  ALLERGY AND IMMUNOLOGIC: No hives or eczema    Vital Signs Last 24 Hrs  T(C): 37 (17 Apr 2018 11:06), Max: 37 (17 Apr 2018 11:06)  T(F): 98.6 (17 Apr 2018 11:06), Max: 98.6 (17 Apr 2018 11:06)  HR: 90 (17 Apr 2018 11:59) (72 - 105)  BP: 101/60 (17 Apr 2018 11:59) (94/31 - 133/78)  BP(mean): --  RR: 16 (17 Apr 2018 11:06) (16 - 17)  SpO2: 95% (17 Apr 2018 11:06) (92% - 95%)    PHYSICAL EXAM:  GENERAL: NAD,  well-developed  HEAD:  Atraumatic, Normocephalic  EYES: EOMI, PERRLA, conjunctiva and sclera clear  ENMT: No tonsillar erythema, exudates, or enlargement; Moist mucous membranes, Good dentition, No lesions  NECK: Supple, No JVD, Normal thyroid  NERVOUS SYSTEM:  Alert & Oriented X3, Good concentration; Motor Strength 5/5 B/L upper and lower extremities; DTRs 2+ intact and symmetric  CHEST/LUNG: Clear to percussion bilaterally; No rales, rhonchi, wheezing, or rubs  HEART: Regular rate and rhythm; No murmurs, rubs, or gallops  ABDOMEN: mild abdominal tenderness EXTREMITIES:  2+ Peripheral Pulses, No clubbing, cyanosis, or edema  LYMPH: No lymphadenopathy noted  SKIN: No rashes or lesions    LABS:                        14.2   10.62 )-----------( 146      ( 17 Apr 2018 08:29 )             43.3     04-17    143  |  109<H>  |  5<L>  ----------------------------<  123<H>  4.0   |  28  |  0.62    Ca    9.0      17 Apr 2018 08:29    TPro  6.6  /  Alb  4.0  /  TBili  0.3  /  DBili  x   /  AST  23  /  ALT  24  /  AlkPhos  70  04-16    PT/INR - ( 16 Apr 2018 08:42 )   PT: 11.4 sec;   INR: 1.04 ratio         PTT - ( 16 Apr 2018 08:42 )  PTT:29.6 sec    CAPILLARY BLOOD GLUCOSE          RADIOLOGY & ADDITIONAL TESTS:    Imaging Personally Reviewed:  [X ] YES  [ ] NO    Consultant(s) Notes Reviewed:  [X ] YES  [ ] NO    Care Discussed with Consultants/Other Providers [X ] YES  [ ] NO

## 2018-04-17 NOTE — PROGRESS NOTE ADULT - ASSESSMENT
57m with anxiety disorder with complain of of upper gastroenterologist bleed but with no objective evidence of such     will plan for discharge if tolerates diet

## 2018-04-17 NOTE — PROGRESS NOTE ADULT - SUBJECTIVE AND OBJECTIVE BOX
Pt c/o epigastric pain and Atypical chest pain - ?GERD      MEDICATIONS  (STANDING):  atorvastatin 40 milliGRAM(s) Oral at bedtime  busPIRone 10 milliGRAM(s) Oral three times a day  citalopram 40 milliGRAM(s) Oral daily  clonazePAM Tablet 2 milliGRAM(s) Oral three times a day  influenza   Vaccine 0.5 milliLiter(s) IntraMuscular once  isosorbide   mononitrate ER Tablet (IMDUR) 60 milliGRAM(s) Oral daily  pantoprazole  Injectable 40 milliGRAM(s) IV Push every 12 hours  risperiDONE   Tablet 2 milliGRAM(s) Oral at bedtime  ticagrelor 90 milliGRAM(s) Oral two times a day    MEDICATIONS  (PRN):  ondansetron Injectable 4 milliGRAM(s) IV Push every 6 hours PRN Nausea      Allergies    No Known Allergies    Intolerances        Vital Signs Last 24 Hrs  T(C): 36.7 (17 Apr 2018 05:15), Max: 37.1 (16 Apr 2018 14:11)  T(F): 98 (17 Apr 2018 05:15), Max: 98.8 (16 Apr 2018 14:11)  HR: 105 (17 Apr 2018 05:15) (69 - 105)  BP: 100/50 (17 Apr 2018 05:15) (100/50 - 133/78)  BP(mean): --  RR: 16 (17 Apr 2018 05:15) (16 - 18)  SpO2: 94% (17 Apr 2018 05:15) (92% - 95%)    PHYSICAL EXAM:  General: NAD.  CVS: S1, S2  Chest: air entry bilaterally present  Abd: BS present, soft, non-tender      LABS:                        14.2   10.62 )-----------( 146      ( 17 Apr 2018 08:29 )             43.3     04-17    143  |  109<H>  |  5<L>  ----------------------------<  123<H>  4.0   |  28  |  0.62    Ca    9.0      17 Apr 2018 08:29    TPro  6.6  /  Alb  4.0  /  TBili  0.3  /  DBili  x   /  AST  23  /  ALT  24  /  AlkPhos  70  04-16    PT/INR - ( 16 Apr 2018 08:42 )   PT: 11.4 sec;   INR: 1.04 ratio         PTT - ( 16 Apr 2018 08:42 )  PTT:29.6 sec    continue protonix  H/H stable - no active bleeding

## 2018-04-18 ENCOUNTER — TRANSCRIPTION ENCOUNTER (OUTPATIENT)
Age: 58
End: 2018-04-18

## 2018-04-18 VITALS
TEMPERATURE: 98 F | OXYGEN SATURATION: 96 % | DIASTOLIC BLOOD PRESSURE: 61 MMHG | RESPIRATION RATE: 16 BRPM | HEART RATE: 70 BPM | SYSTOLIC BLOOD PRESSURE: 124 MMHG

## 2018-04-18 LAB
ANION GAP SERPL CALC-SCNC: 10 MMOL/L — SIGNIFICANT CHANGE UP (ref 5–17)
BUN SERPL-MCNC: 4 MG/DL — LOW (ref 7–23)
CALCIUM SERPL-MCNC: 9.3 MG/DL — SIGNIFICANT CHANGE UP (ref 8.5–10.1)
CHLORIDE SERPL-SCNC: 106 MMOL/L — SIGNIFICANT CHANGE UP (ref 96–108)
CO2 SERPL-SCNC: 26 MMOL/L — SIGNIFICANT CHANGE UP (ref 22–31)
CREAT SERPL-MCNC: 0.69 MG/DL — SIGNIFICANT CHANGE UP (ref 0.5–1.3)
GLUCOSE SERPL-MCNC: 110 MG/DL — HIGH (ref 70–99)
HCT VFR BLD CALC: 41.3 % — SIGNIFICANT CHANGE UP (ref 39–50)
HGB BLD-MCNC: 13.8 G/DL — SIGNIFICANT CHANGE UP (ref 13–17)
MCHC RBC-ENTMCNC: 30.7 PG — SIGNIFICANT CHANGE UP (ref 27–34)
MCHC RBC-ENTMCNC: 33.4 GM/DL — SIGNIFICANT CHANGE UP (ref 32–36)
MCV RBC AUTO: 91.8 FL — SIGNIFICANT CHANGE UP (ref 80–100)
NRBC # BLD: 0 /100 WBCS — SIGNIFICANT CHANGE UP (ref 0–0)
PLATELET # BLD AUTO: 135 K/UL — LOW (ref 150–400)
POTASSIUM SERPL-MCNC: 4.1 MMOL/L — SIGNIFICANT CHANGE UP (ref 3.5–5.3)
POTASSIUM SERPL-SCNC: 4.1 MMOL/L — SIGNIFICANT CHANGE UP (ref 3.5–5.3)
RBC # BLD: 4.5 M/UL — SIGNIFICANT CHANGE UP (ref 4.2–5.8)
RBC # FLD: 13.8 % — SIGNIFICANT CHANGE UP (ref 10.3–14.5)
SODIUM SERPL-SCNC: 142 MMOL/L — SIGNIFICANT CHANGE UP (ref 135–145)
WBC # BLD: 7.04 K/UL — SIGNIFICANT CHANGE UP (ref 3.8–10.5)
WBC # FLD AUTO: 7.04 K/UL — SIGNIFICANT CHANGE UP (ref 3.8–10.5)

## 2018-04-18 PROCEDURE — 99239 HOSP IP/OBS DSCHRG MGMT >30: CPT

## 2018-04-18 RX ORDER — PANTOPRAZOLE SODIUM 20 MG/1
40 TABLET, DELAYED RELEASE ORAL
Qty: 0 | Refills: 0 | Status: DISCONTINUED | OUTPATIENT
Start: 2018-04-18 | End: 2018-04-18

## 2018-04-18 RX ADMIN — ISOSORBIDE MONONITRATE 60 MILLIGRAM(S): 60 TABLET, EXTENDED RELEASE ORAL at 12:09

## 2018-04-18 RX ADMIN — Medication 2 MILLIGRAM(S): at 05:34

## 2018-04-18 RX ADMIN — Medication 2 MILLIGRAM(S): at 13:11

## 2018-04-18 RX ADMIN — Medication 10 MILLIGRAM(S): at 13:11

## 2018-04-18 RX ADMIN — INFLUENZA VIRUS VACCINE 0.5 MILLILITER(S): 15; 15; 15; 15 SUSPENSION INTRAMUSCULAR at 12:17

## 2018-04-18 RX ADMIN — PANTOPRAZOLE SODIUM 40 MILLIGRAM(S): 20 TABLET, DELAYED RELEASE ORAL at 05:32

## 2018-04-18 RX ADMIN — Medication 10 MILLIGRAM(S): at 05:32

## 2018-04-18 RX ADMIN — CITALOPRAM 40 MILLIGRAM(S): 10 TABLET, FILM COATED ORAL at 12:09

## 2018-04-18 RX ADMIN — TICAGRELOR 90 MILLIGRAM(S): 90 TABLET ORAL at 05:32

## 2018-04-18 NOTE — DISCHARGE NOTE ADULT - CARE PLAN
Principal Discharge DX:	Gastrointestinal hemorrhage, unspecified gastrointestinal hemorrhage type  Goal:	resolved may follow up with GI  Assessment and plan of treatment:	please follow up with GI  Secondary Diagnosis:	Gastroesophageal reflux disease without esophagitis  Goal:	please follow up with GI  Secondary Diagnosis:	Schizophrenia, unspecified type  Goal:	please follow up with your regular doctor

## 2018-04-18 NOTE — DISCHARGE NOTE ADULT - MEDICATION SUMMARY - MEDICATIONS TO TAKE
I will START or STAY ON the medications listed below when I get home from the hospital:    aspirin 81 mg oral delayed release tablet  -- 1 tab(s) by mouth once a day  -- Indication: For Heart     isosorbide mononitrate 60 mg oral tablet, extended release  -- 1 tab(s) by mouth once a day (in the morning)  -- Indication: For blood epressure     clonazePAM 2 mg oral tablet  -- 1 tab(s) by mouth 3 times a day  -- Indication: For Schizophrenia, unspecified type    Effexor XR 37.5 mg oral capsule, extended release  -- 1 cap(s) by mouth once a day  -- Indication: For Schizophrenia, unspecified type    atorvastatin 40 mg oral tablet  -- 1 tab(s) by mouth once a day  -- Indication: For Precordial pain    Brilinta (ticagrelor) 90 mg oral tablet  -- 1 tab(s) by mouth 2 times a day  -- Indication: For Precordial pain    risperiDONE 2 mg oral tablet  -- 1 tab(s) by mouth once a day (at bedtime)  -- Indication: For Schizophrenia, unspecified type    busPIRone 15 mg oral tablet  -- orally every 6 hours  -- Indication: For Schizophrenia, unspecified type    hydroCHLOROthiazide 12.5 mg oral capsule  -- 1 cap(s) by mouth once a day  -- Indication: For blood pressure     omeprazole 20 mg oral delayed release tablet  -- 1 tab(s) by mouth 2 times a day  -- Indication: For GERD

## 2018-04-18 NOTE — DISCHARGE NOTE ADULT - HOSPITAL COURSE
History of Present Illness:  History of Present Illness: 	   57 year old male with PMH of Anxiety/depression- schizophrenia, CAD x 4 stents on Brilinta, HTN, HLD, COPDpresenting to ED due to 2 days of nausea/vomiting blood  and dark tarry stools. Feeling some chest pressure on and off, currently without any chest discomfort. patient had a gastric ulcer over 20 years ago       PHYSICAL EXAM:  GENERAL: NAD,  well-developed  HEAD:  Atraumatic, Normocephalic  EYES: EOMI, PERRLA, conjunctiva and sclera clear  ENMT: No tonsillar erythema, exudates, or enlargement; Moist mucous membranes, Good dentition, No lesions  NECK: Supple, No JVD, Normal thyroid  NERVOUS SYSTEM:  Alert & Oriented X3, Good concentration; Motor Strength 5/5 B/L upper and lower extremities; DTRs 2+ intact and symmetric  CHEST/LUNG: Clear to percussion bilaterally; No rales, rhonchi, wheezing, or rubs  HEART: Regular rate and rhythm; No murmurs, rubs, or gallops  ABDOMEN: mild abdominal tenderness EXTREMITIES:  2+ Peripheral Pulses, No clubbing, cyanosis, or edema  LYMPH: No lymphadenopathy noted  SKIN: No rashes or lesions    Assessment and Plan:   · Assessment		  57m with anxiety disorder with complain of of upper gastroenterologist bleed but with no objective evidence of such     will plan for discharge if tolerates diet        Problem/Plan - 1:  ·  Problem: Gastrointestinal hemorrhage, unspecified gastrointestinal hemorrhage type.  Plan: protonix  serial hgb  dr ramirez consult appreciated.      Problem/Plan - 2:  ·  Problem: Precordial pain.  Plan: telemetry  cardiology eval   continue brilinta in spite of gastroenterologist bleed as guiac NEGATIVE and hgb within normal limit.      Problem/Plan - 3:  ·  Problem: Schizophrenia, unspecified type.  Plan: continue home meds.      Problem/Plan - 4:  ·  Problem: Anxiety.  Plan: continue home meds.      Problem/Plan - 5:  ·  Problem: Hypertriglyceridemia.  Plan: continue home meds.    Greater than 45 minutes spent preparing this discharge

## 2018-04-18 NOTE — DISCHARGE NOTE ADULT - PATIENT PORTAL LINK FT
You can access the [facility] Patient Portal, offered by [facility], by registering with the following website: [email or web address]

## 2018-04-20 DIAGNOSIS — K92.0 HEMATEMESIS: ICD-10-CM

## 2018-04-20 DIAGNOSIS — J44.9 CHRONIC OBSTRUCTIVE PULMONARY DISEASE, UNSPECIFIED: ICD-10-CM

## 2018-04-20 DIAGNOSIS — G47.30 SLEEP APNEA, UNSPECIFIED: ICD-10-CM

## 2018-04-20 DIAGNOSIS — F95.2 TOURETTE'S DISORDER: ICD-10-CM

## 2018-04-20 DIAGNOSIS — I10 ESSENTIAL (PRIMARY) HYPERTENSION: ICD-10-CM

## 2018-04-20 DIAGNOSIS — E78.5 HYPERLIPIDEMIA, UNSPECIFIED: ICD-10-CM

## 2018-04-20 DIAGNOSIS — F20.9 SCHIZOPHRENIA, UNSPECIFIED: ICD-10-CM

## 2018-04-20 DIAGNOSIS — F41.8 OTHER SPECIFIED ANXIETY DISORDERS: ICD-10-CM

## 2018-04-20 DIAGNOSIS — Z79.82 LONG TERM (CURRENT) USE OF ASPIRIN: ICD-10-CM

## 2018-04-20 DIAGNOSIS — E78.1 PURE HYPERGLYCERIDEMIA: ICD-10-CM

## 2018-04-20 DIAGNOSIS — I25.10 ATHEROSCLEROTIC HEART DISEASE OF NATIVE CORONARY ARTERY WITHOUT ANGINA PECTORIS: ICD-10-CM

## 2018-04-20 DIAGNOSIS — K92.2 GASTROINTESTINAL HEMORRHAGE, UNSPECIFIED: ICD-10-CM

## 2018-04-20 DIAGNOSIS — K21.9 GASTRO-ESOPHAGEAL REFLUX DISEASE WITHOUT ESOPHAGITIS: ICD-10-CM

## 2018-04-20 DIAGNOSIS — F17.210 NICOTINE DEPENDENCE, CIGARETTES, UNCOMPLICATED: ICD-10-CM

## 2018-04-20 DIAGNOSIS — R07.2 PRECORDIAL PAIN: ICD-10-CM

## 2018-10-04 NOTE — PROGRESS NOTE ADULT - SUBJECTIVE AND OBJECTIVE BOX
Pt stable - no active bleeding noted      MEDICATIONS  (STANDING):  atorvastatin 40 milliGRAM(s) Oral at bedtime  busPIRone 10 milliGRAM(s) Oral three times a day  citalopram 40 milliGRAM(s) Oral daily  clonazePAM Tablet 2 milliGRAM(s) Oral three times a day  isosorbide   mononitrate ER Tablet (IMDUR) 60 milliGRAM(s) Oral daily  pantoprazole  Injectable 40 milliGRAM(s) IV Push every 12 hours  risperiDONE   Tablet 2 milliGRAM(s) Oral at bedtime  ticagrelor 90 milliGRAM(s) Oral two times a day    MEDICATIONS  (PRN):  ondansetron Injectable 4 milliGRAM(s) IV Push every 6 hours PRN Nausea      Allergies    No Known Allergies    Intolerances        Vital Signs Last 24 Hrs  T(C): 36.8 (18 Apr 2018 12:05), Max: 37.1 (17 Apr 2018 16:57)  T(F): 98.2 (18 Apr 2018 12:05), Max: 98.8 (17 Apr 2018 16:57)  HR: 70 (18 Apr 2018 12:05) (66 - 85)  BP: 124/61 (18 Apr 2018 12:05) (101/51 - 124/61)  BP(mean): --  RR: 16 (18 Apr 2018 12:05) (15 - 16)  SpO2: 96% (18 Apr 2018 12:05) (94% - 96%)    PHYSICAL EXAM:  General: NAD.  CVS: S1, S2  Chest: air entry bilaterally present  Abd: BS present, soft, non-tender      LABS:                        13.8   7.04  )-----------( 135      ( 18 Apr 2018 10:39 )             41.3     04-18    142  |  106  |  4<L>  ----------------------------<  110<H>  4.1   |  26  |  0.69    Ca    9.3      18 Apr 2018 10:39        change protonix to PO  diet as tolerated No

## 2019-02-22 ENCOUNTER — RECORD ABSTRACTING (OUTPATIENT)
Age: 59
End: 2019-02-22

## 2019-02-22 ENCOUNTER — NON-APPOINTMENT (OUTPATIENT)
Age: 59
End: 2019-02-22

## 2019-02-22 ENCOUNTER — APPOINTMENT (OUTPATIENT)
Dept: INTERNAL MEDICINE | Facility: CLINIC | Age: 59
End: 2019-02-22
Payer: MEDICARE

## 2019-02-22 VITALS
SYSTOLIC BLOOD PRESSURE: 116 MMHG | WEIGHT: 220 LBS | DIASTOLIC BLOOD PRESSURE: 79 MMHG | BODY MASS INDEX: 34.53 KG/M2 | HEIGHT: 67 IN | HEART RATE: 110 BPM

## 2019-02-22 DIAGNOSIS — R10.819 ABDOMINAL TENDERNESS, UNSPECIFIED SITE: ICD-10-CM

## 2019-02-22 DIAGNOSIS — F17.200 NICOTINE DEPENDENCE, UNSPECIFIED, UNCOMPLICATED: ICD-10-CM

## 2019-02-22 DIAGNOSIS — Z87.898 PERSONAL HISTORY OF OTHER SPECIFIED CONDITIONS: ICD-10-CM

## 2019-02-22 DIAGNOSIS — Z87.19 PERSONAL HISTORY OF OTHER DISEASES OF THE DIGESTIVE SYSTEM: ICD-10-CM

## 2019-02-22 DIAGNOSIS — R10.13 EPIGASTRIC PAIN: ICD-10-CM

## 2019-02-22 DIAGNOSIS — Z86.79 PERSONAL HISTORY OF OTHER DISEASES OF THE CIRCULATORY SYSTEM: ICD-10-CM

## 2019-02-22 DIAGNOSIS — J45.909 UNSPECIFIED ASTHMA, UNCOMPLICATED: ICD-10-CM

## 2019-02-22 DIAGNOSIS — M62.838 OTHER MUSCLE SPASM: ICD-10-CM

## 2019-02-22 PROCEDURE — 36415 COLL VENOUS BLD VENIPUNCTURE: CPT

## 2019-02-22 PROCEDURE — 99214 OFFICE O/P EST MOD 30 MIN: CPT | Mod: 25

## 2019-02-22 PROCEDURE — 93000 ELECTROCARDIOGRAM COMPLETE: CPT

## 2019-02-22 RX ORDER — CLOTRIMAZOLE AND BETAMETHASONE DIPROPIONATE 10; .5 MG/G; MG/G
1-0.05 CREAM TOPICAL
Refills: 0 | Status: DISCONTINUED | COMMUNITY
End: 2019-02-22

## 2019-02-22 RX ORDER — CLONAZEPAM 2 MG/1
2 TABLET ORAL EVERY 6 HOURS
Qty: 120 | Refills: 0 | Status: COMPLETED | COMMUNITY
Start: 2018-11-29 | End: 2019-02-22

## 2019-02-22 RX ORDER — CLOPIDOGREL BISULFATE 75 MG/1
75 TABLET, FILM COATED ORAL
Qty: 30 | Refills: 0 | Status: DISCONTINUED | COMMUNITY
Start: 2018-08-31

## 2019-02-22 RX ORDER — IRBESARTAN 300 MG/1
300 TABLET ORAL DAILY
Refills: 0 | Status: DISCONTINUED | COMMUNITY
End: 2019-02-22

## 2019-02-22 NOTE — HISTORY OF PRESENT ILLNESS
[FreeTextEntry8] : Dec 8 '18  [facility]\par Fx  Cx spine  odontoid process C2 \par [place]\par Collar\par Rehab until last week\par Walking but unsteady \par \par Collar to be on several more months \par \par Neuro  [place]

## 2019-02-23 LAB
25(OH)D3 SERPL-MCNC: 29.4 NG/ML
APPEARANCE: CLEAR
BASOPHILS # BLD AUTO: 0.04 K/UL
BASOPHILS NFR BLD AUTO: 0.5 %
BILIRUBIN URINE: NEGATIVE
BLOOD URINE: NEGATIVE
COLOR: NORMAL
EOSINOPHIL # BLD AUTO: 0.08 K/UL
EOSINOPHIL NFR BLD AUTO: 0.9 %
GLUCOSE QUALITATIVE U: NEGATIVE
HBA1C MFR BLD HPLC: 5.4 %
HCT VFR BLD CALC: 44.1 %
HGB BLD-MCNC: 14.6 G/DL
IMM GRANULOCYTES NFR BLD AUTO: 0.5 %
KETONES URINE: NEGATIVE
LEUKOCYTE ESTERASE URINE: NEGATIVE
LYMPHOCYTES # BLD AUTO: 1.57 K/UL
LYMPHOCYTES NFR BLD AUTO: 17.9 %
MAN DIFF?: NORMAL
MCHC RBC-ENTMCNC: 31.1 PG
MCHC RBC-ENTMCNC: 33.1 GM/DL
MCV RBC AUTO: 94 FL
MONOCYTES # BLD AUTO: 0.49 K/UL
MONOCYTES NFR BLD AUTO: 5.6 %
NEUTROPHILS # BLD AUTO: 6.57 K/UL
NEUTROPHILS NFR BLD AUTO: 74.6 %
NITRITE URINE: NEGATIVE
PH URINE: 6.5
PLATELET # BLD AUTO: 202 K/UL
PROTEIN URINE: NEGATIVE
PSA SERPL-MCNC: 2.41 NG/ML
RBC # BLD: 4.69 M/UL
RBC # FLD: 14.8 %
SPECIFIC GRAVITY URINE: 1.01
TSH SERPL-ACNC: 1.19 UIU/ML
UROBILINOGEN URINE: NORMAL
WBC # FLD AUTO: 8.79 K/UL

## 2019-02-28 LAB
ALBUMIN SERPL ELPH-MCNC: 4.8 G/DL
ALP BLD-CCNC: 82 U/L
ALT SERPL-CCNC: 16 U/L
ANION GAP SERPL CALC-SCNC: 21 MMOL/L
AST SERPL-CCNC: 23 U/L
BILIRUB SERPL-MCNC: <0.2 MG/DL
BUN SERPL-MCNC: 17 MG/DL
CALCIUM SERPL-MCNC: 9.8 MG/DL
CHLORIDE SERPL-SCNC: 100 MMOL/L
CHOLEST SERPL-MCNC: 106 MG/DL
CHOLEST/HDLC SERPL: 2.6 RATIO
CO2 SERPL-SCNC: 18 MMOL/L
CREAT SERPL-MCNC: 0.76 MG/DL
GLUCOSE SERPL-MCNC: 96 MG/DL
HDLC SERPL-MCNC: 41 MG/DL
LDLC SERPL CALC-MCNC: 37 MG/DL
POTASSIUM SERPL-SCNC: 4.6 MMOL/L
PROT SERPL-MCNC: 6.8 G/DL
SODIUM SERPL-SCNC: 139 MMOL/L
TRIGL SERPL-MCNC: 140 MG/DL

## 2019-03-01 LAB — URATE SERPL-MCNC: 5.5 MG/DL

## 2019-05-28 ENCOUNTER — NON-APPOINTMENT (OUTPATIENT)
Age: 59
End: 2019-05-28

## 2019-05-28 ENCOUNTER — APPOINTMENT (OUTPATIENT)
Dept: INTERNAL MEDICINE | Facility: CLINIC | Age: 59
End: 2019-05-28
Payer: MEDICARE

## 2019-05-28 VITALS
OXYGEN SATURATION: 95 % | SYSTOLIC BLOOD PRESSURE: 171 MMHG | HEIGHT: 67 IN | WEIGHT: 210 LBS | DIASTOLIC BLOOD PRESSURE: 113 MMHG | BODY MASS INDEX: 32.96 KG/M2 | HEART RATE: 96 BPM

## 2019-05-28 PROCEDURE — 93000 ELECTROCARDIOGRAM COMPLETE: CPT

## 2019-05-28 PROCEDURE — 99214 OFFICE O/P EST MOD 30 MIN: CPT | Mod: 25

## 2019-05-28 PROCEDURE — 36415 COLL VENOUS BLD VENIPUNCTURE: CPT

## 2019-05-28 NOTE — HISTORY OF PRESENT ILLNESS
[FreeTextEntry1] : Feeling weak and tired\par Chronic [de-identified] : Long standing  symptoms\par Lack of energy\par Back pain\par CP\par ISIDRO\par Anxious\par

## 2019-05-28 NOTE — RESULTS/DATA
[ECG Reviewed] : reviewed [NSR] : normal sinus rhythm [ST Segment Depressed] : the ST segments are depressed [I] : I [II] : II [aVL] : aVL [aVF] : aVF [V4] : V4 [V5] : V5 [V6] : V6 [Non-diagnostic] : The changes are non-diagnostic [NS ST-T Wave Changes] : there are nonspecific ST-T wave changes

## 2019-05-29 LAB
ALBUMIN SERPL ELPH-MCNC: 4.7 G/DL
ALP BLD-CCNC: 76 U/L
ALT SERPL-CCNC: 16 U/L
ANION GAP SERPL CALC-SCNC: 10 MMOL/L
AST SERPL-CCNC: 16 U/L
BASOPHILS # BLD AUTO: 0.03 K/UL
BASOPHILS NFR BLD AUTO: 0.3 %
BILIRUB SERPL-MCNC: 0.2 MG/DL
BUN SERPL-MCNC: 13 MG/DL
CALCIUM SERPL-MCNC: 9.5 MG/DL
CHLORIDE SERPL-SCNC: 100 MMOL/L
CHOLEST SERPL-MCNC: 128 MG/DL
CHOLEST/HDLC SERPL: 2.4 RATIO
CK SERPL-CCNC: 206 U/L
CO2 SERPL-SCNC: 25 MMOL/L
CREAT SERPL-MCNC: 0.6 MG/DL
CRP SERPL-MCNC: 0.1 MG/DL
EOSINOPHIL # BLD AUTO: 0.04 K/UL
EOSINOPHIL NFR BLD AUTO: 0.5 %
ERYTHROCYTE [SEDIMENTATION RATE] IN BLOOD BY WESTERGREN METHOD: 12 MM/HR
ESTIMATED AVERAGE GLUCOSE: 114 MG/DL
GGT SERPL-CCNC: 23 U/L
GLUCOSE SERPL-MCNC: 114 MG/DL
HBA1C MFR BLD HPLC: 5.6 %
HCT VFR BLD CALC: 48.9 %
HDLC SERPL-MCNC: 54 MG/DL
HGB BLD-MCNC: 15.8 G/DL
IMM GRANULOCYTES NFR BLD AUTO: 0.3 %
LDLC SERPL CALC-MCNC: 56 MG/DL
LYMPHOCYTES # BLD AUTO: 1.14 K/UL
LYMPHOCYTES NFR BLD AUTO: 13.1 %
MAN DIFF?: NORMAL
MCHC RBC-ENTMCNC: 29.4 PG
MCHC RBC-ENTMCNC: 32.3 GM/DL
MCV RBC AUTO: 90.9 FL
MONOCYTES # BLD AUTO: 0.6 K/UL
MONOCYTES NFR BLD AUTO: 6.9 %
NEUTROPHILS # BLD AUTO: 6.83 K/UL
NEUTROPHILS NFR BLD AUTO: 78.9 %
NT-PROBNP SERPL-MCNC: 140 PG/ML
PLATELET # BLD AUTO: 166 K/UL
POTASSIUM SERPL-SCNC: 4.9 MMOL/L
PROT SERPL-MCNC: 6.7 G/DL
RBC # BLD: 5.38 M/UL
RBC # FLD: 14.7 %
SODIUM SERPL-SCNC: 135 MMOL/L
T4 FREE SERPL-MCNC: 1.3 NG/DL
TRIGL SERPL-MCNC: 91 MG/DL
TSH SERPL-ACNC: 1.56 UIU/ML
WBC # FLD AUTO: 8.67 K/UL

## 2019-05-30 ENCOUNTER — RX RENEWAL (OUTPATIENT)
Age: 59
End: 2019-05-30

## 2019-06-17 ENCOUNTER — NON-APPOINTMENT (OUTPATIENT)
Age: 59
End: 2019-06-17

## 2019-06-17 ENCOUNTER — APPOINTMENT (OUTPATIENT)
Dept: INTERNAL MEDICINE | Facility: CLINIC | Age: 59
End: 2019-06-17
Payer: MEDICARE

## 2019-06-17 VITALS — DIASTOLIC BLOOD PRESSURE: 79 MMHG | SYSTOLIC BLOOD PRESSURE: 132 MMHG | HEART RATE: 84 BPM

## 2019-06-17 DIAGNOSIS — F95.2 TOURETTE'S DISORDER: ICD-10-CM

## 2019-06-17 DIAGNOSIS — F17.200 NICOTINE DEPENDENCE, UNSPECIFIED, UNCOMPLICATED: ICD-10-CM

## 2019-06-17 DIAGNOSIS — I73.9 PERIPHERAL VASCULAR DISEASE, UNSPECIFIED: ICD-10-CM

## 2019-06-17 DIAGNOSIS — G47.30 SLEEP APNEA, UNSPECIFIED: ICD-10-CM

## 2019-06-17 DIAGNOSIS — R73.9 HYPERGLYCEMIA, UNSPECIFIED: ICD-10-CM

## 2019-06-17 DIAGNOSIS — S12.110A ANTERIOR DISPLACED TYPE II DENS FRACTURE, INITIAL ENCOUNTER FOR CLOSED FRACTURE: ICD-10-CM

## 2019-06-17 DIAGNOSIS — E78.1 PURE HYPERGLYCERIDEMIA: ICD-10-CM

## 2019-06-17 PROCEDURE — 93000 ELECTROCARDIOGRAM COMPLETE: CPT

## 2019-06-17 PROCEDURE — 99214 OFFICE O/P EST MOD 30 MIN: CPT | Mod: 25

## 2019-06-17 PROCEDURE — 36415 COLL VENOUS BLD VENIPUNCTURE: CPT

## 2019-06-17 NOTE — RESULTS/DATA
[NSR] : normal sinus rhythm [NS ST-T Wave Changes] : there are nonspecific ST-T wave changes [No Interval Change] : no interval change

## 2019-06-17 NOTE — HISTORY OF PRESENT ILLNESS
[FreeTextEntry1] : Follow up:\par \par Was at [place]   about 6 d\par     Work up:   Myocardial perfusion study:  no ischemic areas\par          Echo   good EF\par          Chemistires   unrmearkable\par           Neuro eval   suggesting imaging the spinal cord    (S/P cx spine fracture Nov '18 with no symptoms or signs then of spinal cord dysfunction)       urge to urinate comes on suddenly and he is unable to control       Reports of CTs    no abnormalities in spinal cord.   On neuro exam is reported to have nothing to indicate neurologic contribution to the inability to hold on to the urine\par \par        Scheduled to see urologist this week  [de-identified] : "Tired and weak"\par      has been on major doses of benzodiazepine x yrs for control of anxiety\par      dose cut back over the past week    from Klonopin 2 4x   to Klonopin 1 3 x

## 2019-06-17 NOTE — ASSESSMENT
[FreeTextEntry1] : Current premier issue is the issue of control of voiding\par \par Secondary at now:  the withdrawal of the benzodiazepine and management of the anxiety component of the very complex mental illness disorders\par  \par

## 2019-06-18 LAB
ALBUMIN SERPL ELPH-MCNC: 4.4 G/DL
ALP BLD-CCNC: 71 U/L
ALT SERPL-CCNC: 13 U/L
ANION GAP SERPL CALC-SCNC: 15 MMOL/L
AST SERPL-CCNC: 16 U/L
BASOPHILS # BLD AUTO: 0.03 K/UL
BASOPHILS NFR BLD AUTO: 0.3 %
BILIRUB SERPL-MCNC: 0.2 MG/DL
BUN SERPL-MCNC: 10 MG/DL
CALCIUM SERPL-MCNC: 9.1 MG/DL
CHLORIDE SERPL-SCNC: 97 MMOL/L
CHOLEST SERPL-MCNC: 110 MG/DL
CHOLEST/HDLC SERPL: 2.4 RATIO
CK SERPL-CCNC: 155 U/L
CO2 SERPL-SCNC: 24 MMOL/L
CREAT SERPL-MCNC: 0.66 MG/DL
EOSINOPHIL # BLD AUTO: 0.04 K/UL
EOSINOPHIL NFR BLD AUTO: 0.4 %
ESTIMATED AVERAGE GLUCOSE: 117 MG/DL
GGT SERPL-CCNC: 23 U/L
GLUCOSE SERPL-MCNC: 87 MG/DL
HBA1C MFR BLD HPLC: 5.7 %
HCT VFR BLD CALC: 47.2 %
HDLC SERPL-MCNC: 46 MG/DL
HGB BLD-MCNC: 14.7 G/DL
IMM GRANULOCYTES NFR BLD AUTO: 0.6 %
LDLC SERPL CALC-MCNC: 39 MG/DL
LYMPHOCYTES # BLD AUTO: 1.16 K/UL
LYMPHOCYTES NFR BLD AUTO: 13 %
MAN DIFF?: NORMAL
MCHC RBC-ENTMCNC: 29.2 PG
MCHC RBC-ENTMCNC: 31.1 GM/DL
MCV RBC AUTO: 93.8 FL
MONOCYTES # BLD AUTO: 0.38 K/UL
MONOCYTES NFR BLD AUTO: 4.2 %
NEUTROPHILS # BLD AUTO: 7.29 K/UL
NEUTROPHILS NFR BLD AUTO: 81.5 %
PLATELET # BLD AUTO: 193 K/UL
POTASSIUM SERPL-SCNC: 3.9 MMOL/L
PROT SERPL-MCNC: 6.4 G/DL
RBC # BLD: 5.03 M/UL
RBC # FLD: 15.5 %
SODIUM SERPL-SCNC: 136 MMOL/L
T4 FREE SERPL-MCNC: 1.1 NG/DL
TRIGL SERPL-MCNC: 123 MG/DL
TSH SERPL-ACNC: 1.51 UIU/ML
WBC # FLD AUTO: 8.95 K/UL

## 2019-06-19 ENCOUNTER — APPOINTMENT (OUTPATIENT)
Dept: UROLOGY | Facility: CLINIC | Age: 59
End: 2019-06-19
Payer: MEDICARE

## 2019-06-19 PROCEDURE — 99203 OFFICE O/P NEW LOW 30 MIN: CPT

## 2019-06-19 NOTE — PHYSICAL EXAM
[General Appearance - Well Developed] : well developed [] : no respiratory distress [Edema] : no peripheral edema [General Appearance - In No Acute Distress] : no acute distress [Exaggerated Use Of Accessory Muscles For Inspiration] : no accessory muscle use [Respiration, Rhythm And Depth] : normal respiratory rhythm and effort [Abdomen Soft] : soft [Costovertebral Angle Tenderness] : no ~M costovertebral angle tenderness [Abdomen Tenderness] : non-tender [Urethral Meatus] : meatus normal [Urinary Bladder Findings] : the bladder was normal on palpation [Penis Abnormality] : normal circumcised penis [No Prostate Nodules] : no prostate nodules [Testes Mass (___cm)] : there were no testicular masses [Scrotum] : the scrotum was normal [Normal Station and Gait] : the gait and station were normal for the patient's age [Not Anxious] : not anxious [FreeTextEntry1] : As per patient, EDWIGE done 2 weeks ago. [Inguinal Lymph Nodes Enlarged Bilaterally] : inguinal

## 2019-06-19 NOTE — REVIEW OF SYSTEMS
[Feeling Tired] : feeling tired [Chest Pain] : chest pain [Palpitations] : palpitations [Constipation] : constipation [Diarrhea] : diarrhea [Wake up at night to urinate  How many times?  ___] : wakes up to urinate [unfilled] times during the night [Strong urge to urinate] : strong urge to urinate [Difficulty Walking] : difficulty walking [Leakage of urine with urgency] : leakage of urine with urgency [Anxiety] : anxiety [Easy Bleeding] : a tendency for easy bleeding [Depression] : depression [Easy Bruising] : a tendency for easy bruising [Negative] : Endocrine [Incontinence] : incontinence [FreeTextEntry3] : Dribbling of Urine\par Frequent Urination

## 2019-06-19 NOTE — ASSESSMENT
[FreeTextEntry1] : He should significantly limit fluid and coffee intake especially at night. Bed alarms can be used to get up at night to urinate in order to try to prevent enuresis. Also he has incomplete bladder emptying. He might respond to alpha-blocker therapy. Side effects were discussed. Flomax 0.4 mg was prescribed. He will check back with us in a few weeks to assess his response.\par \par Henry Kim MD, FACS\par [facility]\par  of Urology\par \par [address], [address]\[place] [address]\par \par [address], [address]\[place] [address]\par \par Tel: [phone]\par Fax: [phone]

## 2019-07-12 ENCOUNTER — RX RENEWAL (OUTPATIENT)
Age: 59
End: 2019-07-12

## 2019-07-16 RX ORDER — CLONAZEPAM 2 MG/1
2 TABLET ORAL 3 TIMES DAILY
Qty: 90 | Refills: 0 | Status: DISCONTINUED | COMMUNITY
Start: 2019-02-22 | End: 2019-07-16

## 2019-08-07 ENCOUNTER — APPOINTMENT (OUTPATIENT)
Dept: UROLOGY | Facility: CLINIC | Age: 59
End: 2019-08-07
Payer: MEDICARE

## 2019-08-07 VITALS
SYSTOLIC BLOOD PRESSURE: 122 MMHG | HEIGHT: 67 IN | BODY MASS INDEX: 32.96 KG/M2 | OXYGEN SATURATION: 94 % | DIASTOLIC BLOOD PRESSURE: 80 MMHG | RESPIRATION RATE: 16 BRPM | HEART RATE: 81 BPM | WEIGHT: 210 LBS

## 2019-08-07 DIAGNOSIS — N39.44 NOCTURNAL ENURESIS: ICD-10-CM

## 2019-08-07 DIAGNOSIS — R33.9 RETENTION OF URINE, UNSPECIFIED: ICD-10-CM

## 2019-08-07 PROCEDURE — 99213 OFFICE O/P EST LOW 20 MIN: CPT

## 2019-08-07 RX ORDER — OXYBUTYNIN CHLORIDE 5 MG/1
5 TABLET, EXTENDED RELEASE ORAL DAILY
Qty: 90 | Refills: 3 | Status: DISCONTINUED | COMMUNITY
Start: 2019-07-12 | End: 2019-08-07

## 2019-08-07 NOTE — ASSESSMENT
[FreeTextEntry1] : Stop oxybutynin. Continue Flomax. Suggest pelvic floor STARS rehabilitation in order to improve urinary symptoms. Try to wake up in the middle of the night to urinate. Follow up in a few months to assess response.\par \par Henry Kim MD, FACS\par [facility]\par  of Urology\par \par [address], [address]\[place] [address]\par \par [address], [address]\[place] [address]\par \par Tel: [phone]\par Fax: [phone]

## 2019-08-07 NOTE — HISTORY OF PRESENT ILLNESS
[FreeTextEntry1] : He is a 58-year-old man who is seen in follow up. He was started on Flomax which helped with daytime symptoms and frequency and urgency. However enuresis and fecal incontinence is unchanged. Ditropan 5 mg was not helpful. He has not been able to wake up in the middle of the night to urinate to prevent enuresis as of yet.\par Previous notes: In the last one year or more he complains of urge incontinence a few times during the week, enuresis and also loss of bowel movements while he is sleeping. He drinks large amounts of coffee throughout the day. Urinary flow seems to be regular or average for him. There is no hematuria or dysuria. Hemoglobin A1c was 5.7, urinalysis was negative and PSA level was 2.4. In 2018, CT scan of the abdomen was normal. Residual urine was about 300 cc. He is a retired pathologist.

## 2019-08-07 NOTE — PHYSICAL EXAM
[General Appearance - In No Acute Distress] : no acute distress [Abdomen Soft] : soft [Abdomen Tenderness] : non-tender [Costovertebral Angle Tenderness] : no ~M costovertebral angle tenderness [Urethral Meatus] : meatus normal [Penis Abnormality] : normal circumcised penis [Urinary Bladder Findings] : the bladder was normal on palpation [Scrotum] : the scrotum was normal [Testes Mass (___cm)] : there were no testicular masses [No Prostate Nodules] : no prostate nodules [FreeTextEntry1] : EDWIGE done recently.

## 2019-09-03 DIAGNOSIS — F41.9 ANXIETY DISORDER, UNSPECIFIED: ICD-10-CM

## 2019-09-04 PROBLEM — F41.9 ANXIETY DISORDER: Status: ACTIVE | Noted: 2019-02-22

## 2019-10-28 ENCOUNTER — OTHER (OUTPATIENT)
Age: 59
End: 2019-10-28

## 2019-10-30 RX ORDER — TICAGRELOR 90 MG/1
90 TABLET ORAL TWICE DAILY
Qty: 180 | Refills: 3 | Status: DISCONTINUED | COMMUNITY
Start: 2019-02-06 | End: 2019-10-30

## 2019-12-16 RX ORDER — CLONAZEPAM 0.5 MG/1
0.5 TABLET ORAL
Qty: 60 | Refills: 0 | Status: DISCONTINUED | COMMUNITY
Start: 2019-07-16 | End: 2019-12-16

## 2020-01-07 RX ORDER — UMECLIDINIUM 62.5 UG/1
62.5 AEROSOL, POWDER ORAL DAILY
Qty: 30 | Refills: 5 | Status: ACTIVE | COMMUNITY
Start: 2020-01-07 | End: 1900-01-01

## 2020-01-07 RX ORDER — ALBUTEROL SULFATE 90 UG/1
108 (90 BASE) AEROSOL, METERED RESPIRATORY (INHALATION) 4 TIMES DAILY
Qty: 29 | Refills: 0 | Status: ACTIVE | COMMUNITY
Start: 2019-11-05 | End: 1900-01-01

## 2020-01-28 RX ORDER — OMEPRAZOLE 20 MG/1
20 CAPSULE, DELAYED RELEASE ORAL
Qty: 180 | Refills: 3 | Status: DISCONTINUED | COMMUNITY
Start: 2018-11-16 | End: 2020-01-28

## 2020-02-13 ENCOUNTER — APPOINTMENT (OUTPATIENT)
Dept: UROLOGY | Facility: CLINIC | Age: 60
End: 2020-02-13

## 2020-03-04 RX ORDER — ESOMEPRAZOLE MAGNESIUM 40 MG/1
40 CAPSULE, DELAYED RELEASE ORAL DAILY
Qty: 90 | Refills: 3 | Status: DISCONTINUED | COMMUNITY
Start: 2020-01-28 | End: 2020-03-04

## 2020-05-20 ENCOUNTER — APPOINTMENT (OUTPATIENT)
Dept: INTERNAL MEDICINE | Facility: CLINIC | Age: 60
End: 2020-05-20

## 2020-09-03 ENCOUNTER — APPOINTMENT (OUTPATIENT)
Dept: INTERNAL MEDICINE | Facility: CLINIC | Age: 60
End: 2020-09-03

## 2020-09-09 RX ORDER — ASPIRIN 81 MG/1
81 TABLET ORAL DAILY
Qty: 90 | Refills: 1 | Status: ACTIVE | COMMUNITY
Start: 1900-01-01 | End: 1900-01-01

## 2020-09-09 RX ORDER — CLOTRIMAZOLE AND BETAMETHASONE DIPROPIONATE 10; .5 MG/G; MG/G
1-0.05 CREAM TOPICAL 3 TIMES DAILY
Qty: 2 | Refills: 3 | Status: ACTIVE | COMMUNITY
Start: 2018-11-16 | End: 1900-01-01

## 2020-09-29 ENCOUNTER — APPOINTMENT (OUTPATIENT)
Dept: INTERNAL MEDICINE | Facility: CLINIC | Age: 60
End: 2020-09-29

## 2020-10-14 RX ORDER — EZETIMIBE 10 MG/1
10 TABLET ORAL
Qty: 90 | Refills: 3 | Status: ACTIVE | COMMUNITY
Start: 2020-10-14 | End: 1900-01-01

## 2021-01-06 RX ORDER — ESOMEPRAZOLE MAGNESIUM 40 MG/1
40 CAPSULE, DELAYED RELEASE ORAL DAILY
Qty: 90 | Refills: 3 | Status: ACTIVE | COMMUNITY
Start: 2021-01-06 | End: 1900-01-01

## 2021-04-27 ENCOUNTER — APPOINTMENT (OUTPATIENT)
Dept: INTERNAL MEDICINE | Facility: CLINIC | Age: 61
End: 2021-04-27
Payer: MEDICARE

## 2021-04-27 VITALS
BODY MASS INDEX: 31.39 KG/M2 | SYSTOLIC BLOOD PRESSURE: 133 MMHG | HEIGHT: 67 IN | DIASTOLIC BLOOD PRESSURE: 70 MMHG | WEIGHT: 200 LBS | HEART RATE: 83 BPM

## 2021-04-27 DIAGNOSIS — I25.10 ATHEROSCLEROTIC HEART DISEASE OF NATIVE CORONARY ARTERY W/OUT ANGINA PECTORIS: ICD-10-CM

## 2021-04-27 PROCEDURE — 99204 OFFICE O/P NEW MOD 45 MIN: CPT | Mod: 25

## 2021-04-27 PROCEDURE — 36415 COLL VENOUS BLD VENIPUNCTURE: CPT

## 2021-04-27 RX ORDER — AMLODIPINE BESYLATE 5 MG/1
5 TABLET ORAL DAILY
Qty: 90 | Refills: 3 | Status: DISCONTINUED | COMMUNITY
Start: 2019-05-03 | End: 2021-04-27

## 2021-04-27 RX ORDER — METOPROLOL TARTRATE 50 MG/1
50 TABLET, FILM COATED ORAL TWICE DAILY
Qty: 180 | Refills: 1 | Status: ACTIVE | COMMUNITY
Start: 2019-02-06

## 2021-04-27 RX ORDER — HYDROCHLOROTHIAZIDE 12.5 MG/1
12.5 TABLET ORAL
Qty: 180 | Refills: 3 | Status: DISCONTINUED | COMMUNITY
Start: 2019-05-31 | End: 2021-04-27

## 2021-04-27 RX ORDER — NICOTINE 21-14-7MG
21-14-7 KIT TRANSDERMAL
Qty: 1 | Refills: 3 | Status: DISCONTINUED | COMMUNITY
End: 2021-04-27

## 2021-04-27 RX ORDER — PANTOPRAZOLE 40 MG/1
40 TABLET, DELAYED RELEASE ORAL
Qty: 90 | Refills: 3 | Status: DISCONTINUED | COMMUNITY
Start: 2020-05-20 | End: 2021-04-27

## 2021-04-27 RX ORDER — NICOTINE TRANSDERMAL SYSTEM 21 MG/24H
21 PATCH, EXTENDED RELEASE TRANSDERMAL
Qty: 1 | Refills: 0 | Status: DISCONTINUED | COMMUNITY
Start: 2019-07-12 | End: 2021-04-27

## 2021-04-27 RX ORDER — CLONAZEPAM 0.5 MG/1
0.5 TABLET ORAL
Qty: 15 | Refills: 0 | Status: DISCONTINUED | COMMUNITY
Start: 2019-10-28 | End: 2021-04-27

## 2021-04-27 NOTE — PHYSICAL EXAM
[General Appearance - Alert] : alert [General Appearance - In No Acute Distress] : in no acute distress [Sclera] : the sclera and conjunctiva were normal [PERRL With Normal Accommodation] : pupils were equal in size, round, and reactive to light [Extraocular Movements] : extraocular movements were intact [Outer Ear] : the ears and nose were normal in appearance [Oropharynx] : the oropharynx was normal [Neck Appearance] : the appearance of the neck was normal [Neck Cervical Mass (___cm)] : no neck mass was observed [Jugular Venous Distention Increased] : there was no jugular-venous distention [Thyroid Diffuse Enlargement] : the thyroid was not enlarged [Thyroid Nodule] : there were no palpable thyroid nodules [Auscultation Breath Sounds / Voice Sounds] : lungs were clear to auscultation bilaterally [Heart Rate And Rhythm] : heart rate was normal and rhythm regular [Heart Sounds] : normal S1 and S2 [Heart Sounds Gallop] : no gallops [Murmurs] : no murmurs [Heart Sounds Pericardial Friction Rub] : no pericardial rub [Full Pulse] : the pedal pulses are present [Edema] : there was no peripheral edema [Bowel Sounds] : normal bowel sounds [Abdomen Soft] : soft [Abdomen Tenderness] : non-tender [Abdomen Mass (___ Cm)] : no abdominal mass palpated [FreeTextEntry1] : aanal area very inflamed and sore. patient request no rectal exam due to pain [Abnormal Walk] : normal gait [Nail Clubbing] : no clubbing  or cyanosis of the fingernails [Musculoskeletal - Swelling] : no joint swelling seen [Motor Tone] : muscle strength and tone were normal [Skin Color & Pigmentation] : normal skin color and pigmentation [Skin Turgor] : normal skin turgor [] : no rash [Deep Tendon Reflexes (DTR)] : deep tendon reflexes were 2+ and symmetric [Sensation] : the sensory exam was normal to light touch and pinprick [No Focal Deficits] : no focal deficits [Oriented To Time, Place, And Person] : oriented to person, place, and time [Impaired Insight] : insight and judgment were intact [Affect] : the affect was normal

## 2021-04-27 NOTE — HISTORY OF PRESENT ILLNESS
[FreeTextEntry1] : Last five  days blood and puss and occasional clots coming out of his rectum. He is having solid BM's and has pain when he has a BM. No recent antibiotic use. No proatae issues . H/o of incontinence secondary to spina cord injury 10 years ago. \par  he has several cardiac stents and has pacemaker- sees Dr. Goldberg at [place].\par  He is a chronic smoker 15 mini cigars a day- sees Dr. Hays pulmonologist at [place].\par  Last colonoscopy 2015 was normal

## 2021-04-28 LAB
ALBUMIN SERPL ELPH-MCNC: 4.7 G/DL
ALP BLD-CCNC: 90 U/L
ALT SERPL-CCNC: 19 U/L
ANION GAP SERPL CALC-SCNC: 14 MMOL/L
AST SERPL-CCNC: 28 U/L
BASOPHILS # BLD AUTO: 0.03 K/UL
BASOPHILS NFR BLD AUTO: 0.4 %
BILIRUB SERPL-MCNC: 0.2 MG/DL
BUN SERPL-MCNC: 15 MG/DL
CALCIUM SERPL-MCNC: 9.8 MG/DL
CHLORIDE SERPL-SCNC: 104 MMOL/L
CO2 SERPL-SCNC: 22 MMOL/L
CREAT SERPL-MCNC: 0.71 MG/DL
CRP SERPL-MCNC: <3 MG/L
EOSINOPHIL # BLD AUTO: 0.06 K/UL
EOSINOPHIL NFR BLD AUTO: 0.8 %
ERYTHROCYTE [SEDIMENTATION RATE] IN BLOOD BY WESTERGREN METHOD: 12 MM/HR
GLUCOSE SERPL-MCNC: 107 MG/DL
HCT VFR BLD CALC: 42 %
HGB BLD-MCNC: 13.4 G/DL
IMM GRANULOCYTES NFR BLD AUTO: 0.3 %
LYMPHOCYTES # BLD AUTO: 1.34 K/UL
LYMPHOCYTES NFR BLD AUTO: 18.9 %
MAN DIFF?: NORMAL
MCHC RBC-ENTMCNC: 27.3 PG
MCHC RBC-ENTMCNC: 31.9 GM/DL
MCV RBC AUTO: 85.5 FL
MONOCYTES # BLD AUTO: 0.55 K/UL
MONOCYTES NFR BLD AUTO: 7.8 %
NEUTROPHILS # BLD AUTO: 5.08 K/UL
NEUTROPHILS NFR BLD AUTO: 71.8 %
PLATELET # BLD AUTO: 187 K/UL
POTASSIUM SERPL-SCNC: 5.2 MMOL/L
PROT SERPL-MCNC: 6.8 G/DL
RBC # BLD: 4.91 M/UL
RBC # FLD: 15 %
SODIUM SERPL-SCNC: 139 MMOL/L
WBC # FLD AUTO: 7.08 K/UL

## 2021-06-10 ENCOUNTER — NON-APPOINTMENT (OUTPATIENT)
Age: 61
End: 2021-06-10

## 2021-07-01 ENCOUNTER — APPOINTMENT (OUTPATIENT)
Dept: INTERNAL MEDICINE | Facility: CLINIC | Age: 61
End: 2021-07-01

## 2021-07-25 NOTE — ED ADULT NURSE NOTE - NSSISCREENINGQ5_ED_A_ED
Pt ambulatory to R03 with steady gait.  Provided a gown to change into.  Up for ERP evaluation.   No

## 2021-08-18 RX ORDER — BUSPIRONE HYDROCHLORIDE 15 MG/1
15 TABLET ORAL 3 TIMES DAILY
Qty: 270 | Refills: 0 | Status: ACTIVE | COMMUNITY
Start: 2018-11-12 | End: 1900-01-01

## 2021-11-11 ENCOUNTER — NON-APPOINTMENT (OUTPATIENT)
Age: 61
End: 2021-11-11

## 2021-11-29 ENCOUNTER — NON-APPOINTMENT (OUTPATIENT)
Age: 61
End: 2021-11-29

## 2021-11-29 ENCOUNTER — RX RENEWAL (OUTPATIENT)
Age: 61
End: 2021-11-29

## 2022-01-06 ENCOUNTER — RX RENEWAL (OUTPATIENT)
Age: 62
End: 2022-01-06

## 2022-01-10 ENCOUNTER — APPOINTMENT (OUTPATIENT)
Dept: INTERNAL MEDICINE | Facility: CLINIC | Age: 62
End: 2022-01-10
Payer: MEDICARE

## 2022-01-10 ENCOUNTER — LABORATORY RESULT (OUTPATIENT)
Age: 62
End: 2022-01-10

## 2022-01-10 VITALS
DIASTOLIC BLOOD PRESSURE: 62 MMHG | BODY MASS INDEX: 32.49 KG/M2 | HEIGHT: 67 IN | OXYGEN SATURATION: 95 % | TEMPERATURE: 97.8 F | HEART RATE: 74 BPM | WEIGHT: 207 LBS | SYSTOLIC BLOOD PRESSURE: 111 MMHG

## 2022-01-10 DIAGNOSIS — F20.9 SCHIZOPHRENIA, UNSPECIFIED: ICD-10-CM

## 2022-01-10 DIAGNOSIS — I25.119 ATHEROSCLEROTIC HEART DISEASE OF NATIVE CORONARY ARTERY WITH UNSPECIFIED ANGINA PECTORIS: ICD-10-CM

## 2022-01-10 DIAGNOSIS — K52.9 NONINFECTIVE GASTROENTERITIS AND COLITIS, UNSPECIFIED: ICD-10-CM

## 2022-01-10 DIAGNOSIS — E78.5 HYPERLIPIDEMIA, UNSPECIFIED: ICD-10-CM

## 2022-01-10 PROCEDURE — 99213 OFFICE O/P EST LOW 20 MIN: CPT

## 2022-01-10 RX ORDER — HYDROCORTISONE 100 MG/60ML
100 ENEMA RECTAL DAILY
Qty: 30 | Refills: 2 | Status: ACTIVE | COMMUNITY
Start: 2022-01-10 | End: 1900-01-01

## 2022-01-10 RX ORDER — ISOSORBIDE MONONITRATE 30 MG/1
30 TABLET, EXTENDED RELEASE ORAL DAILY
Qty: 90 | Refills: 3 | Status: DISCONTINUED | COMMUNITY
Start: 2019-02-06 | End: 2022-01-10

## 2022-01-10 RX ORDER — OXYBUTYNIN CHLORIDE 10 MG/1
10 TABLET, EXTENDED RELEASE ORAL
Qty: 90 | Refills: 0 | Status: COMPLETED | COMMUNITY
Start: 2021-06-18

## 2022-01-10 RX ORDER — FUROSEMIDE 20 MG/1
20 TABLET ORAL
Qty: 10 | Refills: 0 | Status: ACTIVE | COMMUNITY
Start: 2021-09-27

## 2022-01-10 RX ORDER — RISPERIDONE 2 MG/1
2 TABLET, ORALLY DISINTEGRATING ORAL
Qty: 90 | Refills: 0 | Status: DISCONTINUED | COMMUNITY
Start: 2021-05-19

## 2022-01-10 RX ORDER — METOPROLOL TARTRATE 25 MG/1
25 TABLET, FILM COATED ORAL
Qty: 180 | Refills: 0 | Status: DISCONTINUED | COMMUNITY
Start: 2020-11-05

## 2022-01-10 RX ORDER — UMECLIDINIUM BROMIDE AND VILANTEROL TRIFENATATE 62.5; 25 UG/1; UG/1
62.5-25 POWDER RESPIRATORY (INHALATION)
Qty: 60 | Refills: 0 | Status: ACTIVE | COMMUNITY
Start: 2021-04-09

## 2022-01-10 NOTE — HISTORY OF PRESENT ILLNESS
[FreeTextEntry1] : Had Chest pain had cardiac cath that was negative. last few weeks he has been have mucousy BM's with "white pus "  No pain or diarrhea. \par  he last had this in April i prescibed cipro and proctozone and it resolved . last colonoscopy was 2015\par  last had cardiac stent 3 years ago he has 6 stents.

## 2022-01-12 LAB
ALBUMIN SERPL ELPH-MCNC: 4.8 G/DL
ALP BLD-CCNC: 82 U/L
ALT SERPL-CCNC: 14 U/L
ANION GAP SERPL CALC-SCNC: 14 MMOL/L
AST SERPL-CCNC: 20 U/L
BASOPHILS # BLD AUTO: 0.03 K/UL
BASOPHILS NFR BLD AUTO: 0.4 %
BILIRUB SERPL-MCNC: 0.2 MG/DL
BUN SERPL-MCNC: 20 MG/DL
CALCIUM SERPL-MCNC: 9.4 MG/DL
CHLORIDE SERPL-SCNC: 103 MMOL/L
CO2 SERPL-SCNC: 23 MMOL/L
CREAT SERPL-MCNC: 0.79 MG/DL
CRP SERPL-MCNC: <3 MG/L
EOSINOPHIL # BLD AUTO: 0.12 K/UL
EOSINOPHIL NFR BLD AUTO: 1.5 %
ERYTHROCYTE [SEDIMENTATION RATE] IN BLOOD BY WESTERGREN METHOD: 23 MM/HR
GLUCOSE SERPL-MCNC: 101 MG/DL
HCT VFR BLD CALC: 45.8 %
HGB BLD-MCNC: 13.9 G/DL
IMM GRANULOCYTES NFR BLD AUTO: 0.2 %
LYMPHOCYTES # BLD AUTO: 1.87 K/UL
LYMPHOCYTES NFR BLD AUTO: 22.9 %
MAN DIFF?: NORMAL
MCHC RBC-ENTMCNC: 24 PG
MCHC RBC-ENTMCNC: 30.3 GM/DL
MCV RBC AUTO: 79.2 FL
MONOCYTES # BLD AUTO: 0.56 K/UL
MONOCYTES NFR BLD AUTO: 6.8 %
MPO AB + PR3 PNL SER: NORMAL
NEUTROPHILS # BLD AUTO: 5.58 K/UL
NEUTROPHILS NFR BLD AUTO: 68.2 %
PLATELET # BLD AUTO: 198 K/UL
POTASSIUM SERPL-SCNC: 4.8 MMOL/L
PROT SERPL-MCNC: 7.1 G/DL
RBC # BLD: 5.78 M/UL
RBC # FLD: 16.8 %
SODIUM SERPL-SCNC: 140 MMOL/L
WBC # FLD AUTO: 8.18 K/UL

## 2022-01-19 DIAGNOSIS — Z12.11 ENCOUNTER FOR SCREENING FOR MALIGNANT NEOPLASM OF COLON: ICD-10-CM

## 2022-01-19 RX ORDER — SODIUM SULFATE, POTASSIUM SULFATE, MAGNESIUM SULFATE 17.5; 3.13; 1.6 G/ML; G/ML; G/ML
17.5-3.13-1.6 SOLUTION, CONCENTRATE ORAL
Qty: 1 | Refills: 0 | Status: ACTIVE | COMMUNITY
Start: 2022-01-19 | End: 1900-01-01

## 2022-01-20 LAB
BAKER'S YEAST AB QL: 8.3 UNITS
BAKER'S YEAST IGA QL IA: 5.8 UNITS
BAKER'S YEAST IGA QN IA: NEGATIVE
BAKER'S YEAST IGG QN IA: NEGATIVE

## 2022-02-07 NOTE — ASSESSMENT
[FreeTextEntry1] : Something behaviorally acute...? physical   Emptied bladder of urine all over himself
IV discontinued, cath removed intact

## 2022-02-14 ENCOUNTER — APPOINTMENT (OUTPATIENT)
Dept: INTERNAL MEDICINE | Facility: CLINIC | Age: 62
End: 2022-02-14
Payer: MEDICARE

## 2022-02-14 VITALS
HEART RATE: 61 BPM | BODY MASS INDEX: 32.49 KG/M2 | WEIGHT: 207 LBS | DIASTOLIC BLOOD PRESSURE: 70 MMHG | HEIGHT: 67 IN | OXYGEN SATURATION: 97 % | SYSTOLIC BLOOD PRESSURE: 118 MMHG | TEMPERATURE: 97.7 F

## 2022-02-14 DIAGNOSIS — L30.8 OTHER SPECIFIED DERMATITIS: ICD-10-CM

## 2022-02-14 DIAGNOSIS — L08.9 LOCAL INFECTION OF THE SKIN AND SUBCUTANEOUS TISSUE, UNSPECIFIED: ICD-10-CM

## 2022-02-14 PROCEDURE — 99213 OFFICE O/P EST LOW 20 MIN: CPT

## 2022-02-14 RX ORDER — HYDROXYZINE HYDROCHLORIDE 25 MG/1
25 TABLET ORAL
Qty: 90 | Refills: 0 | Status: ACTIVE | COMMUNITY
Start: 2020-02-18 | End: 1900-01-01

## 2022-02-14 RX ORDER — CLOTRIMAZOLE AND BETAMETHASONE DIPROPIONATE 10; .5 MG/G; MG/G
1-0.05 CREAM TOPICAL TWICE DAILY
Qty: 1 | Refills: 3 | Status: ACTIVE | COMMUNITY
Start: 2022-02-14 | End: 1900-01-01

## 2022-02-14 NOTE — HISTORY OF PRESENT ILLNESS
[FreeTextEntry1] : Patient came with c/o itchy skin lesions over both arms and lower legs [de-identified] : Patient is 61 year male  with PMH of CAD, HTN, Hyperlipidemia, , came today with c/o itchy skin lesion over both arms and and lower legs \par for few wks\par As per patient he had similar symptoms before\par

## 2022-02-14 NOTE — PLAN
[FreeTextEntry1] : Found to have healing skin lesions over extremities- could be due to dryness of the skin- recommend to start using on daily base Lac-Hydrin lotion BID, avoid hot bath or hot water shower, take Atarax as needed for itchiness and topical steroids for 2 wks BID as directed. Call or RTC if symptoms will not improve as directed  Patient verbalized understanding of above\par \par

## 2022-02-14 NOTE — PHYSICAL EXAM
[No Acute Distress] : no acute distress [PERRL] : pupils equal round and reactive to light [EOMI] : extraocular movements intact [No Respiratory Distress] : no respiratory distress  [Clear to Auscultation] : lungs were clear to auscultation bilaterally [Normal Rate] : normal rate  [Soft] : abdomen soft [de-identified] : healing scrachy skin lesions over both lower parts of arms and lower legs, single lesion over the back of the neck

## 2022-02-14 NOTE — REVIEW OF SYSTEMS
[Chills] : no chills [Fatigue] : no fatigue [Vision Problems] : no vision problems [Itching] : no itching [Nasal Discharge] : no nasal discharge [Sore Throat] : no sore throat [Chest Pain] : no chest pain [Palpitations] : no palpitations [Cough] : no cough [Dyspnea on Exertion] : not dyspnea on exertion [Abdominal Pain] : no abdominal pain [Nausea] : no nausea [Itching] : itching [Skin Rash] : skin rash [FreeTextEntry2] : AAO X 3 [de-identified] : Itchy skin lesions over both arms and lower legs

## 2022-03-08 RX ORDER — ATORVASTATIN CALCIUM 40 MG/1
40 TABLET, FILM COATED ORAL
Qty: 90 | Refills: 3 | Status: ACTIVE | COMMUNITY
Start: 2019-02-06 | End: 1900-01-01

## 2022-03-09 ENCOUNTER — RX RENEWAL (OUTPATIENT)
Age: 62
End: 2022-03-09

## 2022-03-09 RX ORDER — HYDROCORTISONE 2.5% 25 MG/G
2.5 CREAM TOPICAL
Qty: 30 | Refills: 0 | Status: ACTIVE | COMMUNITY
Start: 2021-04-27 | End: 1900-01-01

## 2022-03-09 RX ORDER — GABAPENTIN 100 MG/1
100 CAPSULE ORAL 3 TIMES DAILY
Qty: 540 | Refills: 3 | Status: ACTIVE | COMMUNITY
Start: 2020-09-04 | End: 1900-01-01

## 2022-03-09 RX ORDER — ISOSORBIDE MONONITRATE 60 MG/1
60 TABLET, EXTENDED RELEASE ORAL DAILY
Qty: 90 | Refills: 2 | Status: ACTIVE | COMMUNITY
Start: 2021-06-10 | End: 1900-01-01

## 2022-03-22 RX ORDER — RISPERIDONE 2 MG/1
2 TABLET, FILM COATED ORAL
Qty: 90 | Refills: 3 | Status: ACTIVE | COMMUNITY
Start: 2018-11-12

## 2022-03-22 RX ORDER — VENLAFAXINE HYDROCHLORIDE 150 MG/1
150 CAPSULE, EXTENDED RELEASE ORAL
Qty: 90 | Refills: 3 | Status: ACTIVE | COMMUNITY
Start: 2018-11-12

## 2022-03-22 RX ORDER — RISPERIDONE 1 MG/1
1 TABLET, ORALLY DISINTEGRATING ORAL
Refills: 0 | Status: ACTIVE | COMMUNITY
Start: 2021-09-13

## 2022-03-22 RX ORDER — VENLAFAXINE HYDROCHLORIDE 75 MG/1
75 CAPSULE, EXTENDED RELEASE ORAL DAILY
Qty: 90 | Refills: 3 | Status: ACTIVE | COMMUNITY
Start: 2018-11-12

## 2022-03-25 ENCOUNTER — APPOINTMENT (OUTPATIENT)
Dept: INTERNAL MEDICINE | Facility: AMBULATORY MEDICAL SERVICES | Age: 62
End: 2022-03-25

## 2022-03-28 ENCOUNTER — RX RENEWAL (OUTPATIENT)
Age: 62
End: 2022-03-28

## 2022-04-13 RX ORDER — METRONIDAZOLE 500 MG/1
500 TABLET ORAL 3 TIMES DAILY
Qty: 30 | Refills: 0 | Status: ACTIVE | COMMUNITY
Start: 2022-04-13 | End: 1900-01-01

## 2022-05-02 ENCOUNTER — APPOINTMENT (OUTPATIENT)
Dept: INTERNAL MEDICINE | Facility: CLINIC | Age: 62
End: 2022-05-02

## 2022-05-25 ENCOUNTER — APPOINTMENT (OUTPATIENT)
Dept: INTERNAL MEDICINE | Facility: CLINIC | Age: 62
End: 2022-05-25

## 2022-06-07 ENCOUNTER — RX RENEWAL (OUTPATIENT)
Age: 62
End: 2022-06-07

## 2022-06-07 RX ORDER — HYDROCORTISONE 1 %
12 CREAM (GRAM) TOPICAL TWICE DAILY
Qty: 1200 | Refills: 3 | Status: ACTIVE | COMMUNITY
Start: 2022-02-14 | End: 1900-01-01

## 2022-06-09 RX ORDER — OXYBUTYNIN CHLORIDE 10 MG/1
10 TABLET, EXTENDED RELEASE ORAL
Qty: 90 | Refills: 1 | Status: ACTIVE | COMMUNITY
Start: 2020-04-29 | End: 1900-01-01

## 2022-09-12 RX ORDER — CIPROFLOXACIN HYDROCHLORIDE 500 MG/1
500 TABLET, FILM COATED ORAL
Qty: 20 | Refills: 0 | Status: ACTIVE | COMMUNITY
Start: 2021-04-27 | End: 1900-01-01

## 2022-09-28 ENCOUNTER — INPATIENT (INPATIENT)
Facility: HOSPITAL | Age: 62
LOS: 2 days | Discharge: HOME | DRG: 378 | End: 2022-10-01
Attending: INTERNAL MEDICINE | Admitting: STUDENT IN AN ORGANIZED HEALTH CARE EDUCATION/TRAINING PROGRAM
Payer: MEDICARE

## 2022-09-28 VITALS
WEIGHT: 190.04 LBS | TEMPERATURE: 98 F | OXYGEN SATURATION: 96 % | HEART RATE: 66 BPM | HEIGHT: 65 IN | SYSTOLIC BLOOD PRESSURE: 97 MMHG | DIASTOLIC BLOOD PRESSURE: 65 MMHG | RESPIRATION RATE: 20 BRPM

## 2022-09-28 DIAGNOSIS — K92.2 GASTROINTESTINAL HEMORRHAGE, UNSPECIFIED: ICD-10-CM

## 2022-09-28 LAB
ALBUMIN SERPL ELPH-MCNC: 4.1 G/DL — SIGNIFICANT CHANGE UP (ref 3.3–5)
ALP SERPL-CCNC: 87 U/L — SIGNIFICANT CHANGE UP (ref 40–120)
ALT FLD-CCNC: 13 U/L — SIGNIFICANT CHANGE UP (ref 10–45)
ANION GAP SERPL CALC-SCNC: 10 MMOL/L — SIGNIFICANT CHANGE UP (ref 5–17)
APPEARANCE UR: CLEAR — SIGNIFICANT CHANGE UP
AST SERPL-CCNC: 17 U/L — SIGNIFICANT CHANGE UP (ref 10–40)
BACTERIA # UR AUTO: NEGATIVE — SIGNIFICANT CHANGE UP
BASOPHILS # BLD AUTO: 0.03 K/UL — SIGNIFICANT CHANGE UP (ref 0–0.2)
BASOPHILS NFR BLD AUTO: 0.4 % — SIGNIFICANT CHANGE UP (ref 0–2)
BILIRUB SERPL-MCNC: 0.3 MG/DL — SIGNIFICANT CHANGE UP (ref 0.2–1.2)
BILIRUB UR-MCNC: NEGATIVE — SIGNIFICANT CHANGE UP
BUN SERPL-MCNC: 22 MG/DL — SIGNIFICANT CHANGE UP (ref 7–23)
CALCIUM SERPL-MCNC: 9.2 MG/DL — SIGNIFICANT CHANGE UP (ref 8.4–10.5)
CHLORIDE SERPL-SCNC: 104 MMOL/L — SIGNIFICANT CHANGE UP (ref 96–108)
CO2 SERPL-SCNC: 22 MMOL/L — SIGNIFICANT CHANGE UP (ref 22–31)
COLOR SPEC: YELLOW — SIGNIFICANT CHANGE UP
CREAT SERPL-MCNC: 0.69 MG/DL — SIGNIFICANT CHANGE UP (ref 0.5–1.3)
DIFF PNL FLD: NEGATIVE — SIGNIFICANT CHANGE UP
EGFR: 105 ML/MIN/1.73M2 — SIGNIFICANT CHANGE UP
EOSINOPHIL # BLD AUTO: 0.12 K/UL — SIGNIFICANT CHANGE UP (ref 0–0.5)
EOSINOPHIL NFR BLD AUTO: 1.7 % — SIGNIFICANT CHANGE UP (ref 0–6)
EPI CELLS # UR: 1 /HPF — SIGNIFICANT CHANGE UP
GAS PNL BLDV: SIGNIFICANT CHANGE UP
GLUCOSE SERPL-MCNC: 198 MG/DL — HIGH (ref 70–99)
GLUCOSE UR QL: NEGATIVE — SIGNIFICANT CHANGE UP
HCT VFR BLD CALC: 35.7 % — LOW (ref 39–50)
HCT VFR BLD CALC: 39.3 % — SIGNIFICANT CHANGE UP (ref 39–50)
HGB BLD-MCNC: 11.2 G/DL — LOW (ref 13–17)
HGB BLD-MCNC: 12.6 G/DL — LOW (ref 13–17)
HYALINE CASTS # UR AUTO: 4 /LPF — HIGH (ref 0–2)
IMM GRANULOCYTES NFR BLD AUTO: 0.1 % — SIGNIFICANT CHANGE UP (ref 0–0.9)
KETONES UR-MCNC: NEGATIVE — SIGNIFICANT CHANGE UP
LEUKOCYTE ESTERASE UR-ACNC: NEGATIVE — SIGNIFICANT CHANGE UP
LIDOCAIN IGE QN: 42 U/L — SIGNIFICANT CHANGE UP (ref 7–60)
LYMPHOCYTES # BLD AUTO: 1.46 K/UL — SIGNIFICANT CHANGE UP (ref 1–3.3)
LYMPHOCYTES # BLD AUTO: 20.9 % — SIGNIFICANT CHANGE UP (ref 13–44)
MAGNESIUM SERPL-MCNC: 2 MG/DL — SIGNIFICANT CHANGE UP (ref 1.6–2.6)
MCHC RBC-ENTMCNC: 26.3 PG — LOW (ref 27–34)
MCHC RBC-ENTMCNC: 26.4 PG — LOW (ref 27–34)
MCHC RBC-ENTMCNC: 31.4 GM/DL — LOW (ref 32–36)
MCHC RBC-ENTMCNC: 32.1 GM/DL — SIGNIFICANT CHANGE UP (ref 32–36)
MCV RBC AUTO: 82.4 FL — SIGNIFICANT CHANGE UP (ref 80–100)
MCV RBC AUTO: 83.8 FL — SIGNIFICANT CHANGE UP (ref 80–100)
MONOCYTES # BLD AUTO: 0.42 K/UL — SIGNIFICANT CHANGE UP (ref 0–0.9)
MONOCYTES NFR BLD AUTO: 6 % — SIGNIFICANT CHANGE UP (ref 2–14)
NEUTROPHILS # BLD AUTO: 4.95 K/UL — SIGNIFICANT CHANGE UP (ref 1.8–7.4)
NEUTROPHILS NFR BLD AUTO: 70.9 % — SIGNIFICANT CHANGE UP (ref 43–77)
NITRITE UR-MCNC: NEGATIVE — SIGNIFICANT CHANGE UP
NRBC # BLD: 0 /100 WBCS — SIGNIFICANT CHANGE UP (ref 0–0)
NRBC # BLD: 0 /100 WBCS — SIGNIFICANT CHANGE UP (ref 0–0)
PH UR: 6.5 — SIGNIFICANT CHANGE UP (ref 5–8)
PHOSPHATE SERPL-MCNC: 2.4 MG/DL — LOW (ref 2.5–4.5)
PLATELET # BLD AUTO: 160 K/UL — SIGNIFICANT CHANGE UP (ref 150–400)
PLATELET # BLD AUTO: 182 K/UL — SIGNIFICANT CHANGE UP (ref 150–400)
POTASSIUM SERPL-MCNC: 3.6 MMOL/L — SIGNIFICANT CHANGE UP (ref 3.5–5.3)
POTASSIUM SERPL-SCNC: 3.6 MMOL/L — SIGNIFICANT CHANGE UP (ref 3.5–5.3)
PROT SERPL-MCNC: 6.4 G/DL — SIGNIFICANT CHANGE UP (ref 6–8.3)
PROT UR-MCNC: ABNORMAL
RAPID RVP RESULT: SIGNIFICANT CHANGE UP
RBC # BLD: 4.26 M/UL — SIGNIFICANT CHANGE UP (ref 4.2–5.8)
RBC # BLD: 4.77 M/UL — SIGNIFICANT CHANGE UP (ref 4.2–5.8)
RBC # FLD: 13.9 % — SIGNIFICANT CHANGE UP (ref 10.3–14.5)
RBC # FLD: 14.1 % — SIGNIFICANT CHANGE UP (ref 10.3–14.5)
RBC CASTS # UR COMP ASSIST: 1 /HPF — SIGNIFICANT CHANGE UP (ref 0–4)
SARS-COV-2 RNA SPEC QL NAA+PROBE: SIGNIFICANT CHANGE UP
SODIUM SERPL-SCNC: 136 MMOL/L — SIGNIFICANT CHANGE UP (ref 135–145)
SP GR SPEC: >1.05 (ref 1.01–1.02)
UROBILINOGEN FLD QL: ABNORMAL
WBC # BLD: 5.39 K/UL — SIGNIFICANT CHANGE UP (ref 3.8–10.5)
WBC # BLD: 6.99 K/UL — SIGNIFICANT CHANGE UP (ref 3.8–10.5)
WBC # FLD AUTO: 5.39 K/UL — SIGNIFICANT CHANGE UP (ref 3.8–10.5)
WBC # FLD AUTO: 6.99 K/UL — SIGNIFICANT CHANGE UP (ref 3.8–10.5)
WBC UR QL: 4 /HPF — SIGNIFICANT CHANGE UP (ref 0–5)

## 2022-09-28 PROCEDURE — 93010 ELECTROCARDIOGRAM REPORT: CPT

## 2022-09-28 PROCEDURE — 99223 1ST HOSP IP/OBS HIGH 75: CPT

## 2022-09-28 PROCEDURE — G1004: CPT

## 2022-09-28 PROCEDURE — 99285 EMERGENCY DEPT VISIT HI MDM: CPT

## 2022-09-28 PROCEDURE — 74177 CT ABD & PELVIS W/CONTRAST: CPT | Mod: 26,MG

## 2022-09-28 RX ORDER — SODIUM CHLORIDE 9 MG/ML
1000 INJECTION INTRAMUSCULAR; INTRAVENOUS; SUBCUTANEOUS ONCE
Refills: 0 | Status: COMPLETED | OUTPATIENT
Start: 2022-09-28 | End: 2022-09-28

## 2022-09-28 RX ORDER — ONDANSETRON 8 MG/1
4 TABLET, FILM COATED ORAL ONCE
Refills: 0 | Status: COMPLETED | OUTPATIENT
Start: 2022-09-28 | End: 2022-09-28

## 2022-09-28 RX ORDER — PANTOPRAZOLE SODIUM 20 MG/1
80 TABLET, DELAYED RELEASE ORAL ONCE
Refills: 0 | Status: COMPLETED | OUTPATIENT
Start: 2022-09-28 | End: 2022-09-28

## 2022-09-28 RX ORDER — FAMOTIDINE 10 MG/ML
20 INJECTION INTRAVENOUS ONCE
Refills: 0 | Status: COMPLETED | OUTPATIENT
Start: 2022-09-28 | End: 2022-09-28

## 2022-09-28 RX ORDER — KETOCONAZOLE 200 MG
200 TABLET ORAL DAILY
Refills: 0 | Status: DISCONTINUED | OUTPATIENT
Start: 2022-09-28 | End: 2022-09-29

## 2022-09-28 RX ADMIN — Medication 200 MILLIGRAM(S): at 20:23

## 2022-09-28 RX ADMIN — FAMOTIDINE 20 MILLIGRAM(S): 10 INJECTION INTRAVENOUS at 16:32

## 2022-09-28 RX ADMIN — PANTOPRAZOLE SODIUM 80 MILLIGRAM(S): 20 TABLET, DELAYED RELEASE ORAL at 16:32

## 2022-09-28 RX ADMIN — SODIUM CHLORIDE 2000 MILLILITER(S): 9 INJECTION INTRAMUSCULAR; INTRAVENOUS; SUBCUTANEOUS at 16:32

## 2022-09-28 RX ADMIN — ONDANSETRON 4 MILLIGRAM(S): 8 TABLET, FILM COATED ORAL at 16:32

## 2022-09-28 RX ADMIN — SODIUM CHLORIDE 1000 MILLILITER(S): 9 INJECTION INTRAMUSCULAR; INTRAVENOUS; SUBCUTANEOUS at 19:05

## 2022-09-28 NOTE — H&P ADULT - MENTAL STATUS
AXOx3.   Speech fluent.   Cognition grossly intact.  Interactive with examiner with no need for prompting.

## 2022-09-28 NOTE — ED PROVIDER NOTE - OBJECTIVE STATEMENT
60 yo male with pmhx htn dm, cad with stents, copd, gastritis, presenting with 2 days of bloody vomiting and diarrhea. began to have multiple episodes of n/v/d yesterday. saw pmd, given cipro/flagyl, since then states has had increased frequency of diarrhea and vomiting, both with blood in it. BIBEMS.    denies sick contacts, cp, sob, fevers, recent travels.

## 2022-09-28 NOTE — H&P ADULT - PROBLEM SELECTOR PLAN 2
Will defer issue of ASA and Plavix for review by the Daytime Provider.  Consider formal cardiology evaluation in the AM. Will defer issue of ASA and Plavix for review by the Daytime Provider.  Presently cardiac status compensated.  Consider formal cardiology evaluation in the AM.

## 2022-09-28 NOTE — H&P ADULT - NSICDXPASTSURGICALHX_GEN_ALL_CORE_FT
PAST SURGICAL HISTORY:  No significant past surgical history      PAST SURGICAL HISTORY:  Cardiac pacemaker

## 2022-09-28 NOTE — H&P ADULT - HISTORY OF PRESENT ILLNESS
NIGHT HOSPITALIST:   Patient UNKNOWN to me previously, assigned to me at this point via the ER to admit this 62 y/o M--followed by his PCP above--family aware of admission but the patient did not wish for examiner to contact family at this hour--patient with a history of anxiety / depression, compensated schizophrenia maintained on Buspar,  NIGHT HOSPITALIST:   Patient UNKNOWN to me previously, assigned to me at this point via the ER to admit this 60 y/o M--followed by his PCP above--family aware of admission (patient lives with his mother) but the patient did not wish for examiner to contact family at this hour--patient with a history of anxiety / depression, compensated schizophrenia maintained on Buspar, hydroxyzine, Effexor XR (patient reports he is on 225 mg daily), CAD with multiple PCI (unclear to last PCI but patient reports 2 years ago?), COPD with patient still smoking 2 packs/day, with patient reportedly being treated by his PCP with Cipro/Flagyl but with N/V with blood streaks/coffee grounds (?).  No abdominal pain.  NO LOC or dizziness.  Patient with loose but formed BM, denies fever, chills, rigors.   NO weight loss but with limited PO due to N/V.  NO diaphoresis.   NO palliative or exacerbating factors.  NO NSAID use. NIGHT HOSPITALIST:   Patient UNKNOWN to me previously, assigned to me at this point via the ER to admit this 62 y/o M--followed by his PCP above--family aware of admission (patient lives with his mother) but the patient did not wish for examiner to contact family at this hour--patient with a history of anxiety / depression, compensated schizophrenia maintained on Buspar, hydroxyzine, Effexor XR (patient reports he is on 225 mg daily), CAD with multiple PCI (unclear to last PCI but patient reports 2 years ago?), + PPM, COPD with patient still smoking 2 packs/day, with patient reportedly being treated by his PCP with Cipro/Flagyl but with N/V with blood streaks/coffee grounds (?).  No abdominal pain.  NO LOC or dizziness.  Patient with loose but formed BM, denies fever, chills, rigors.   NO weight loss but with limited PO due to N/V.  NO diaphoresis.   NO palliative or exacerbating factors.  NO NSAID use.

## 2022-09-28 NOTE — PATIENT PROFILE ADULT - CAREGIVER PHONE NUMBER
AVS reviewed with the patient  Pt verbalized understanding   Pt ambulated to main entrance where he was parked and drove himself home [phone]

## 2022-09-28 NOTE — H&P ADULT - NSHPREVIEWOFSYSTEMS_GEN_ALL_CORE
NO HA, no focal weakness.  NO chest pain/pressure.  NO palpitations.  No cough, no dyspnoea.   Occasional wheezing. NO HA, no focal weakness.  NO chest pain/pressure.  NO palpitations.  No cough, no dyspnoea.   Occasional wheezing.  No back pain, no tearing back pain.  Noted groin rash.    NO joint pain.  NO weight loss.  NO SI/HI.  NO dysuria, no hematuria.  NO thyroid symptoms.    Patient reports COVID-19 vaccine x 4.

## 2022-09-28 NOTE — ED PROVIDER NOTE - CLINICAL SUMMARY MEDICAL DECISION MAKING FREE TEXT BOX
60 yo male with pmhx htn dm, cad with stents, copd, gastritis, presenting with 2 days of bloody vomiting and diarrhea. suggestive of gastroenteritis vs gastritis. will eval with labs, ct abd, will give gi cocktail, ivf, and will reassess.

## 2022-09-28 NOTE — H&P ADULT - NSHPSOCIALHISTORY_GEN_ALL_CORE
No ethanol or recreational substance use.   Still smokes 2 packs/day since teens but agrees to quit and to the Nicoderm patch.   Lives with mother.

## 2022-09-28 NOTE — H&P ADULT - ASSESSMENT
NIGHT HOSPITALIST:     Presentation of patient with upper GI bleed in the setting of N/V (unclear to prior treatment with Cipro/Flagyl but patient with no clinical evidence of colitis) with patient on ASA and Plavix (unclear to last cath PCI but patient reports >2 years ago), with PPM, with presently stable hemodynamics but would follow orthostatics.    PPI IV.   Would consider formal GI evaluation in the AM for possible endoscopy.   Clears for now.    Would consider formal cardiology evaluation in the AM on the issue of patient's dual antiplatelet therapy in the context of the upper GI bleed.  EPS assessment for PPM.    Will follow FS S/S for patient's hyperglycemia.   HgBA1C.  Urine with proteins>>will send urine protein/Cr assay.    Would clarify in the AM from the patient's  pharmacy his Effexor Rx with patient now on 225 mg from last Rx of 37.5 in 2018. NIGHT HOSPITALIST:     Presentation of patient with upper GI bleed in the setting of N/V (unclear to prior treatment with Cipro/Flagyl but patient with no clinical evidence of colitis) with patient on ASA and Plavix (unclear to last cath PCI but patient reports >2 years ago), with PPM, with presently stable hemodynamics but would follow orthostatics.    PPI IV.   Would consider formal GI evaluation in the AM for possible endoscopy.   Clears for now.    Would consider formal cardiology evaluation in the AM on the issue of patient's dual antiplatelet therapy in the context of the upper GI bleed.  EPS assessment for PPM.    Will follow FS S/S for patient's hyperglycemia.   HgBA1C.  Urine with proteins>>will send urine protein/Cr assay.    Would clarify in the AM from the patient's  pharmacy his Effexor Rx with patient now on 225 mg from last Rx of 37.5 in 2018.    Would favor topical over systemic antifungal for now with suspected tinea cruris.    Would consider workup of possible LEFT 7th pathologic fx.  SPEP/IPEP/UPEP ordered.

## 2022-09-28 NOTE — PATIENT PROFILE ADULT - FALL HARM RISK - HARM RISK INTERVENTIONS

## 2022-09-28 NOTE — ED PROVIDER NOTE - PROGRESS NOTE DETAILS
exam w/ perineal pain, pt w/ redness in the groin w/ excoriation and pt w evidence of candidasis will tx w/ketonazole Shiv James MD (PGY-2): ct w/ e/o diverticulosis and changes at the level of the stomach concerning for gastritis. High risk patient, still having bleeding per rectum. Admission for monitoring and possible endoscopic exploration by GI.

## 2022-09-28 NOTE — ED ADULT NURSE NOTE - OBJECTIVE STATEMENT
61y male, AAOx4, PMH PPM, stents x7, on Plavix and daily aspirin, pt BIBEMS for n/v/d x 2 days, states that he has recently seen at [facility] and [facility] on abx for similar symptoms, pt reports 2pack a day smoker, denies Ha, chest pain, shortness of breath, reports "blood and pus" in diarrhea, pt also states that he has been unable to tolerate PO due to n/v, pt moves all extremities w/+ pulses, reports that he prefers to go to [place] but EMS brought him here. bed in lowest position, side rails up, comfort and safety provided.

## 2022-09-28 NOTE — H&P ADULT - PROBLEM SELECTOR PLAN 1
See above.  Clears.   IV PPI.  Consider formal GI evaluation in the AM. See above.  Clears.   IV PPI.  Consider formal GI evaluation in the AM>>>I have requested a GI consult via [facility] Email.

## 2022-09-28 NOTE — H&P ADULT - NSHPADDITIONALINFOADULT_GEN_ALL_CORE
NIGHT HOSPITALIST:    Patient aware of course and agrees with plan/care as above.  The patient did not wish for examiner to contact his family at this hour.   Given patient's comorbidities, patient's long term prognosis is guarded.  Emotional support provided to patient.   Care reviewed with covering NP/PA for endorsement to my physician colleagues in the AM.    Manny England MD  Available on [employer] Teams. NIGHT HOSPITALIST:    Patient aware of course and agrees with plan/care as above.  The patient did not wish for examiner to contact his family at this hour.   Given patient's comorbidities, patient's long term prognosis is guarded.  Emotional support provided to patient.   Care reviewed with covering Senior Admitting Physician for endorsement to my physician colleagues in the AM.    Manny England MD  Available on [employer] Teams.

## 2022-09-28 NOTE — H&P ADULT - NSHPLABSRESULTS_GEN_ALL_CORE
WBC 5.3  normal differential    Hgb 12.6>> 11.2    Platelets of 160K    Random glucose of 198    Cr 0.6  K+ 3.6    UA with 30 protein.    Lactate 1.4    RVP and COVID-19 PCR>>negative.    Chest radiograph ordered.    CTT abdomen with thickening of the stomach may represent gastritis v underdistention. WBC 5.3  normal differential    Hgb 12.6>> 11.2    Platelets of 160K    Random glucose of 198    Cr 0.6  K+ 3.6    UA with 30 protein.    Lactate 1.4    RVP and COVID-19 PCR>>negative.    Chest radiograph ordered.    EKG tracing personally interpreted by me with sinus at 74 with Q wave V3 present in April 2015, and flattened T waves V2V3.    CTT abdomen with thickening of the stomach may represent gastritis v underdistention. WBC 5.3  normal differential    Hgb 12.6>> 11.2    Platelets of 160K    Random glucose of 198    Cr 0.6  K+ 3.6    UA with 30 protein.    Lactate 1.4    RVP and COVID-19 PCR>>negative.    Chest radiograph personally interpreted by me with + PPM, no acute infiltrate or effusion but NEW LEFT 7th rib fracture ? pathologic    EKG tracing personally interpreted by me with sinus at 74 with Q wave V3 present in April 2015, and flattened T waves V2V3.    CTT abdomen with thickening of the stomach may represent gastritis v underdistention.

## 2022-09-28 NOTE — ED PROVIDER NOTE - NSICDXPASTMEDICALHX_GEN_ALL_CORE_FT
PAST MEDICAL HISTORY:  Anxiety     Chronic bronchitis     COPD (chronic obstructive pulmonary disease)     DM (diabetes mellitus) resolved with weight loss    GERD (gastroesophageal reflux disease)     HLD (hyperlipidemia)     Hypertriglyceridemia     ОЛЬГА (obstructive sleep apnea)     Schizophrenia     Smoker     Smoker     Tourettes syndrome

## 2022-09-28 NOTE — H&P ADULT - PROBLEM SELECTOR PLAN 6
Transitions of Care Status:  1.  Name of PCP:    Kerwin Mari MD (PCP) [phone]  2.  PCP Contacted on Admission: [ ] Y    [x ] N    3.  PCP contacted at Discharge: [ ] Y    [ ] N    [ ] N/A  4.  Post-Discharge Appointment Date and Location:  5.  Summary of Handoff given to PCP:

## 2022-09-28 NOTE — H&P ADULT - NSHPSOURCEINFOTX_GEN_ALL_CORE
Reviewed Medex with patient via patient recall.   [facility]  closed at this hour.    Family aware of admission and the patient did not wish for examiner to contact family at this hour. Reviewed Medex with patient via patient recall.   [facility]  closed at this hour.    Family aware of admission and the patient did not wish for examiner to contact family at this hour.  [facility] Stop # [phone]

## 2022-09-29 ENCOUNTER — APPOINTMENT (OUTPATIENT)
Dept: INTERNAL MEDICINE | Facility: CLINIC | Age: 62
End: 2022-09-29

## 2022-09-29 DIAGNOSIS — I25.10 ATHEROSCLEROTIC HEART DISEASE OF NATIVE CORONARY ARTERY WITHOUT ANGINA PECTORIS: ICD-10-CM

## 2022-09-29 DIAGNOSIS — Z95.0 PRESENCE OF CARDIAC PACEMAKER: Chronic | ICD-10-CM

## 2022-09-29 DIAGNOSIS — M84.48XA PATHOLOGICAL FRACTURE, OTHER SITE, INITIAL ENCOUNTER FOR FRACTURE: ICD-10-CM

## 2022-09-29 DIAGNOSIS — R73.9 HYPERGLYCEMIA, UNSPECIFIED: ICD-10-CM

## 2022-09-29 DIAGNOSIS — K92.2 GASTROINTESTINAL HEMORRHAGE, UNSPECIFIED: ICD-10-CM

## 2022-09-29 DIAGNOSIS — Z86.59 PERSONAL HISTORY OF OTHER MENTAL AND BEHAVIORAL DISORDERS: ICD-10-CM

## 2022-09-29 DIAGNOSIS — Z02.9 ENCOUNTER FOR ADMINISTRATIVE EXAMINATIONS, UNSPECIFIED: ICD-10-CM

## 2022-09-29 DIAGNOSIS — Z29.9 ENCOUNTER FOR PROPHYLACTIC MEASURES, UNSPECIFIED: ICD-10-CM

## 2022-09-29 LAB
A1C WITH ESTIMATED AVERAGE GLUCOSE RESULT: 6.2 % — HIGH (ref 4–5.6)
ALBUMIN SERPL ELPH-MCNC: 3.7 G/DL — SIGNIFICANT CHANGE UP (ref 3.3–5)
ALP SERPL-CCNC: 78 U/L — SIGNIFICANT CHANGE UP (ref 40–120)
ALT FLD-CCNC: 12 U/L — SIGNIFICANT CHANGE UP (ref 10–45)
ANION GAP SERPL CALC-SCNC: 9 MMOL/L — SIGNIFICANT CHANGE UP (ref 5–17)
AST SERPL-CCNC: 17 U/L — SIGNIFICANT CHANGE UP (ref 10–40)
BASOPHILS # BLD AUTO: 0.03 K/UL — SIGNIFICANT CHANGE UP (ref 0–0.2)
BASOPHILS NFR BLD AUTO: 0.4 % — SIGNIFICANT CHANGE UP (ref 0–2)
BILIRUB SERPL-MCNC: 0.3 MG/DL — SIGNIFICANT CHANGE UP (ref 0.2–1.2)
BLD GP AB SCN SERPL QL: NEGATIVE — SIGNIFICANT CHANGE UP
BUN SERPL-MCNC: 18 MG/DL — SIGNIFICANT CHANGE UP (ref 7–23)
CALCIUM SERPL-MCNC: 8.8 MG/DL — SIGNIFICANT CHANGE UP (ref 8.4–10.5)
CHLORIDE SERPL-SCNC: 108 MMOL/L — SIGNIFICANT CHANGE UP (ref 96–108)
CO2 SERPL-SCNC: 23 MMOL/L — SIGNIFICANT CHANGE UP (ref 22–31)
CREAT ?TM UR-MCNC: 112 MG/DL — SIGNIFICANT CHANGE UP
CREAT SERPL-MCNC: 0.61 MG/DL — SIGNIFICANT CHANGE UP (ref 0.5–1.3)
EGFR: 109 ML/MIN/1.73M2 — SIGNIFICANT CHANGE UP
EOSINOPHIL # BLD AUTO: 0.08 K/UL — SIGNIFICANT CHANGE UP (ref 0–0.5)
EOSINOPHIL NFR BLD AUTO: 1.2 % — SIGNIFICANT CHANGE UP (ref 0–6)
ESTIMATED AVERAGE GLUCOSE: 131 MG/DL — HIGH (ref 68–114)
GLUCOSE BLDC GLUCOMTR-MCNC: 107 MG/DL — HIGH (ref 70–99)
GLUCOSE BLDC GLUCOMTR-MCNC: 116 MG/DL — HIGH (ref 70–99)
GLUCOSE BLDC GLUCOMTR-MCNC: 120 MG/DL — HIGH (ref 70–99)
GLUCOSE BLDC GLUCOMTR-MCNC: 97 MG/DL — SIGNIFICANT CHANGE UP (ref 70–99)
GLUCOSE SERPL-MCNC: 93 MG/DL — SIGNIFICANT CHANGE UP (ref 70–99)
HCT VFR BLD CALC: 40.1 % — SIGNIFICANT CHANGE UP (ref 39–50)
HCV AB S/CO SERPL IA: 0.16 S/CO — SIGNIFICANT CHANGE UP (ref 0–0.99)
HCV AB SERPL-IMP: SIGNIFICANT CHANGE UP
HGB BLD-MCNC: 12.8 G/DL — LOW (ref 13–17)
IGA FLD-MCNC: 99 MG/DL — SIGNIFICANT CHANGE UP (ref 84–499)
IGG FLD-MCNC: 550 MG/DL — LOW (ref 610–1660)
IGM SERPL-MCNC: 28 MG/DL — LOW (ref 35–242)
IMM GRANULOCYTES NFR BLD AUTO: 0.4 % — SIGNIFICANT CHANGE UP (ref 0–0.9)
KAPPA LC SER QL IFE: 1.68 MG/DL — SIGNIFICANT CHANGE UP (ref 0.33–1.94)
KAPPA/LAMBDA FREE LIGHT CHAIN RATIO, SERUM: 1.26 RATIO — SIGNIFICANT CHANGE UP (ref 0.26–1.65)
LAMBDA LC SER QL IFE: 1.33 MG/DL — SIGNIFICANT CHANGE UP (ref 0.57–2.63)
LYMPHOCYTES # BLD AUTO: 1.37 K/UL — SIGNIFICANT CHANGE UP (ref 1–3.3)
LYMPHOCYTES # BLD AUTO: 20 % — SIGNIFICANT CHANGE UP (ref 13–44)
MAGNESIUM SERPL-MCNC: 2 MG/DL — SIGNIFICANT CHANGE UP (ref 1.6–2.6)
MCHC RBC-ENTMCNC: 26.9 PG — LOW (ref 27–34)
MCHC RBC-ENTMCNC: 31.9 GM/DL — LOW (ref 32–36)
MCV RBC AUTO: 84.2 FL — SIGNIFICANT CHANGE UP (ref 80–100)
MONOCYTES # BLD AUTO: 0.4 K/UL — SIGNIFICANT CHANGE UP (ref 0–0.9)
MONOCYTES NFR BLD AUTO: 5.8 % — SIGNIFICANT CHANGE UP (ref 2–14)
NEUTROPHILS # BLD AUTO: 4.95 K/UL — SIGNIFICANT CHANGE UP (ref 1.8–7.4)
NEUTROPHILS NFR BLD AUTO: 72.2 % — SIGNIFICANT CHANGE UP (ref 43–77)
NRBC # BLD: 0 /100 WBCS — SIGNIFICANT CHANGE UP (ref 0–0)
PHOSPHATE SERPL-MCNC: 3.1 MG/DL — SIGNIFICANT CHANGE UP (ref 2.5–4.5)
PLATELET # BLD AUTO: 162 K/UL — SIGNIFICANT CHANGE UP (ref 150–400)
POTASSIUM SERPL-MCNC: 4.2 MMOL/L — SIGNIFICANT CHANGE UP (ref 3.5–5.3)
POTASSIUM SERPL-SCNC: 4.2 MMOL/L — SIGNIFICANT CHANGE UP (ref 3.5–5.3)
PROT ?TM UR-MCNC: 12 MG/DL — SIGNIFICANT CHANGE UP (ref 0–12)
PROT SERPL-MCNC: 5.6 G/DL — LOW (ref 6–8.3)
PROT SERPL-MCNC: 5.6 G/DL — LOW (ref 6–8.3)
PROT SERPL-MCNC: 6 G/DL — SIGNIFICANT CHANGE UP (ref 6–8.3)
PROT/CREAT UR-RTO: 0.1 RATIO — SIGNIFICANT CHANGE UP (ref 0–0.2)
RBC # BLD: 4.76 M/UL — SIGNIFICANT CHANGE UP (ref 4.2–5.8)
RBC # FLD: 13.8 % — SIGNIFICANT CHANGE UP (ref 10.3–14.5)
RH IG SCN BLD-IMP: POSITIVE — SIGNIFICANT CHANGE UP
SODIUM SERPL-SCNC: 140 MMOL/L — SIGNIFICANT CHANGE UP (ref 135–145)
WBC # BLD: 6.86 K/UL — SIGNIFICANT CHANGE UP (ref 3.8–10.5)
WBC # FLD AUTO: 6.86 K/UL — SIGNIFICANT CHANGE UP (ref 3.8–10.5)

## 2022-09-29 PROCEDURE — 99233 SBSQ HOSP IP/OBS HIGH 50: CPT | Mod: GC

## 2022-09-29 PROCEDURE — 99232 SBSQ HOSP IP/OBS MODERATE 35: CPT | Mod: GC

## 2022-09-29 PROCEDURE — 99221 1ST HOSP IP/OBS SF/LOW 40: CPT | Mod: GC

## 2022-09-29 PROCEDURE — 71045 X-RAY EXAM CHEST 1 VIEW: CPT | Mod: 26,59

## 2022-09-29 PROCEDURE — 71110 X-RAY EXAM RIBS BIL 3 VIEWS: CPT | Mod: 26

## 2022-09-29 RX ORDER — LIDOCAINE 4 G/100G
1 CREAM TOPICAL ONCE
Refills: 0 | Status: COMPLETED | OUTPATIENT
Start: 2022-09-29 | End: 2022-09-29

## 2022-09-29 RX ORDER — ACETAMINOPHEN 500 MG
650 TABLET ORAL EVERY 6 HOURS
Refills: 0 | Status: DISCONTINUED | OUTPATIENT
Start: 2022-09-29 | End: 2022-10-01

## 2022-09-29 RX ORDER — NICOTINE POLACRILEX 2 MG
1 GUM BUCCAL DAILY
Refills: 0 | Status: DISCONTINUED | OUTPATIENT
Start: 2022-09-29 | End: 2022-10-01

## 2022-09-29 RX ORDER — INSULIN LISPRO 100/ML
VIAL (ML) SUBCUTANEOUS AT BEDTIME
Refills: 0 | Status: DISCONTINUED | OUTPATIENT
Start: 2022-09-29 | End: 2022-10-01

## 2022-09-29 RX ORDER — GABAPENTIN 400 MG/1
1 CAPSULE ORAL
Qty: 0 | Refills: 0 | DISCHARGE

## 2022-09-29 RX ORDER — ACETAMINOPHEN 500 MG
975 TABLET ORAL ONCE
Refills: 0 | Status: COMPLETED | OUTPATIENT
Start: 2022-09-29 | End: 2022-09-29

## 2022-09-29 RX ORDER — DEXTROSE 50 % IN WATER 50 %
25 SYRINGE (ML) INTRAVENOUS ONCE
Refills: 0 | Status: DISCONTINUED | OUTPATIENT
Start: 2022-09-29 | End: 2022-10-01

## 2022-09-29 RX ORDER — ATORVASTATIN CALCIUM 80 MG/1
80 TABLET, FILM COATED ORAL AT BEDTIME
Refills: 0 | Status: DISCONTINUED | OUTPATIENT
Start: 2022-09-29 | End: 2022-10-01

## 2022-09-29 RX ORDER — NYSTATIN CREAM 100000 [USP'U]/G
1 CREAM TOPICAL
Refills: 0 | Status: DISCONTINUED | OUTPATIENT
Start: 2022-09-29 | End: 2022-10-01

## 2022-09-29 RX ORDER — CLONAZEPAM 1 MG
1 TABLET ORAL
Qty: 0 | Refills: 0 | DISCHARGE

## 2022-09-29 RX ORDER — SODIUM CHLORIDE 9 MG/ML
1000 INJECTION, SOLUTION INTRAVENOUS
Refills: 0 | Status: DISCONTINUED | OUTPATIENT
Start: 2022-09-29 | End: 2022-10-01

## 2022-09-29 RX ORDER — DEXTROSE 50 % IN WATER 50 %
12.5 SYRINGE (ML) INTRAVENOUS ONCE
Refills: 0 | Status: DISCONTINUED | OUTPATIENT
Start: 2022-09-29 | End: 2022-10-01

## 2022-09-29 RX ORDER — SOD SULF/SODIUM/NAHCO3/KCL/PEG
4000 SOLUTION, RECONSTITUTED, ORAL ORAL ONCE
Refills: 0 | Status: COMPLETED | OUTPATIENT
Start: 2022-09-29 | End: 2022-09-29

## 2022-09-29 RX ORDER — PANTOPRAZOLE SODIUM 20 MG/1
40 TABLET, DELAYED RELEASE ORAL
Refills: 0 | Status: DISCONTINUED | OUTPATIENT
Start: 2022-09-29 | End: 2022-10-01

## 2022-09-29 RX ORDER — ATORVASTATIN CALCIUM 80 MG/1
1 TABLET, FILM COATED ORAL
Qty: 0 | Refills: 0 | DISCHARGE

## 2022-09-29 RX ORDER — INSULIN LISPRO 100/ML
VIAL (ML) SUBCUTANEOUS
Refills: 0 | Status: DISCONTINUED | OUTPATIENT
Start: 2022-09-29 | End: 2022-10-01

## 2022-09-29 RX ORDER — DEXTROSE 50 % IN WATER 50 %
15 SYRINGE (ML) INTRAVENOUS ONCE
Refills: 0 | Status: DISCONTINUED | OUTPATIENT
Start: 2022-09-29 | End: 2022-10-01

## 2022-09-29 RX ORDER — INFLUENZA VIRUS VACCINE 15; 15; 15; 15 UG/.5ML; UG/.5ML; UG/.5ML; UG/.5ML
0.5 SUSPENSION INTRAMUSCULAR ONCE
Refills: 0 | Status: DISCONTINUED | OUTPATIENT
Start: 2022-09-29 | End: 2022-10-01

## 2022-09-29 RX ORDER — ISOSORBIDE MONONITRATE 60 MG/1
60 TABLET, EXTENDED RELEASE ORAL DAILY
Refills: 0 | Status: DISCONTINUED | OUTPATIENT
Start: 2022-09-29 | End: 2022-10-01

## 2022-09-29 RX ORDER — GLUCAGON INJECTION, SOLUTION 0.5 MG/.1ML
1 INJECTION, SOLUTION SUBCUTANEOUS ONCE
Refills: 0 | Status: DISCONTINUED | OUTPATIENT
Start: 2022-09-29 | End: 2022-10-01

## 2022-09-29 RX ORDER — HYDROXYZINE HCL 10 MG
25 TABLET ORAL THREE TIMES A DAY
Refills: 0 | Status: DISCONTINUED | OUTPATIENT
Start: 2022-09-29 | End: 2022-10-01

## 2022-09-29 RX ORDER — OXYBUTYNIN CHLORIDE 5 MG
1 TABLET ORAL
Qty: 0 | Refills: 0 | DISCHARGE

## 2022-09-29 RX ORDER — METOPROLOL TARTRATE 50 MG
1 TABLET ORAL
Qty: 0 | Refills: 0 | DISCHARGE

## 2022-09-29 RX ORDER — RISPERIDONE 4 MG/1
1 TABLET ORAL
Qty: 0 | Refills: 0 | DISCHARGE

## 2022-09-29 RX ORDER — HYDROXYZINE HCL 10 MG
1 TABLET ORAL
Qty: 0 | Refills: 0 | DISCHARGE

## 2022-09-29 RX ORDER — VENLAFAXINE HCL 75 MG
1 CAPSULE, EXT RELEASE 24 HR ORAL
Qty: 0 | Refills: 0 | DISCHARGE

## 2022-09-29 RX ORDER — METOPROLOL TARTRATE 50 MG
50 TABLET ORAL
Refills: 0 | Status: DISCONTINUED | OUTPATIENT
Start: 2022-09-29 | End: 2022-10-01

## 2022-09-29 RX ORDER — IPRATROPIUM/ALBUTEROL SULFATE 18-103MCG
3 AEROSOL WITH ADAPTER (GRAM) INHALATION EVERY 6 HOURS
Refills: 0 | Status: DISCONTINUED | OUTPATIENT
Start: 2022-09-29 | End: 2022-10-01

## 2022-09-29 RX ORDER — EZETIMIBE 10 MG/1
1 TABLET ORAL
Qty: 0 | Refills: 0 | DISCHARGE

## 2022-09-29 RX ORDER — OMEPRAZOLE 10 MG/1
1 CAPSULE, DELAYED RELEASE ORAL
Qty: 0 | Refills: 0 | DISCHARGE

## 2022-09-29 RX ORDER — VENLAFAXINE HCL 75 MG
225 CAPSULE, EXT RELEASE 24 HR ORAL
Qty: 0 | Refills: 0 | DISCHARGE

## 2022-09-29 RX ORDER — ISOSORBIDE MONONITRATE 60 MG/1
1 TABLET, EXTENDED RELEASE ORAL
Qty: 0 | Refills: 0 | DISCHARGE

## 2022-09-29 RX ADMIN — Medication 15 MILLIGRAM(S): at 05:17

## 2022-09-29 RX ADMIN — Medication 15 MILLIGRAM(S): at 21:54

## 2022-09-29 RX ADMIN — Medication 3 MILLILITER(S): at 23:49

## 2022-09-29 RX ADMIN — NYSTATIN CREAM 1 APPLICATION(S): 100000 CREAM TOPICAL at 17:50

## 2022-09-29 RX ADMIN — Medication 3 MILLILITER(S): at 17:58

## 2022-09-29 RX ADMIN — Medication 25 MILLIGRAM(S): at 05:17

## 2022-09-29 RX ADMIN — Medication 975 MILLIGRAM(S): at 19:13

## 2022-09-29 RX ADMIN — Medication 3 MILLILITER(S): at 12:38

## 2022-09-29 RX ADMIN — Medication 15 MILLIGRAM(S): at 13:09

## 2022-09-29 RX ADMIN — LIDOCAINE 1 PATCH: 4 CREAM TOPICAL at 20:26

## 2022-09-29 RX ADMIN — PANTOPRAZOLE SODIUM 40 MILLIGRAM(S): 20 TABLET, DELAYED RELEASE ORAL at 17:51

## 2022-09-29 RX ADMIN — Medication 50 MILLIGRAM(S): at 05:30

## 2022-09-29 RX ADMIN — NYSTATIN CREAM 1 APPLICATION(S): 100000 CREAM TOPICAL at 05:17

## 2022-09-29 RX ADMIN — Medication 1 PATCH: at 22:29

## 2022-09-29 RX ADMIN — ATORVASTATIN CALCIUM 80 MILLIGRAM(S): 80 TABLET, FILM COATED ORAL at 21:53

## 2022-09-29 RX ADMIN — Medication 25 MILLIGRAM(S): at 13:10

## 2022-09-29 RX ADMIN — Medication 25 MILLIGRAM(S): at 21:53

## 2022-09-29 RX ADMIN — Medication 1 PATCH: at 12:36

## 2022-09-29 RX ADMIN — Medication 975 MILLIGRAM(S): at 18:34

## 2022-09-29 RX ADMIN — ISOSORBIDE MONONITRATE 60 MILLIGRAM(S): 60 TABLET, EXTENDED RELEASE ORAL at 12:35

## 2022-09-29 RX ADMIN — PANTOPRAZOLE SODIUM 40 MILLIGRAM(S): 20 TABLET, DELAYED RELEASE ORAL at 05:16

## 2022-09-29 RX ADMIN — Medication 4000 MILLILITER(S): at 18:27

## 2022-09-29 RX ADMIN — Medication 3 MILLILITER(S): at 05:16

## 2022-09-29 NOTE — PROGRESS NOTE ADULT - SUBJECTIVE AND OBJECTIVE BOX
Patient is a 61y old  Male who presents with a chief complaint of N/V with blood streaks/ coffee grounds (?) (28 Sep 2022 23:43)      SUBJECTIVE / OVERNIGHT EVENTS:    MEDICATIONS  (STANDING):  albuterol/ipratropium for Nebulization 3 milliLiter(s) Nebulizer every 6 hours  atorvastatin 80 milliGRAM(s) Oral at bedtime  busPIRone 15 milliGRAM(s) Oral three times a day  dextrose 5%. 1000 milliLiter(s) (50 mL/Hr) IV Continuous <Continuous>  dextrose 5%. 1000 milliLiter(s) (100 mL/Hr) IV Continuous <Continuous>  dextrose 50% Injectable 25 Gram(s) IV Push once  dextrose 50% Injectable 12.5 Gram(s) IV Push once  dextrose 50% Injectable 25 Gram(s) IV Push once  glucagon  Injectable 1 milliGRAM(s) IntraMuscular once  hydrOXYzine hydrochloride 25 milliGRAM(s) Oral three times a day  influenza   Vaccine 0.5 milliLiter(s) IntraMuscular once  insulin lispro (ADMELOG) corrective regimen sliding scale   SubCutaneous three times a day before meals  insulin lispro (ADMELOG) corrective regimen sliding scale   SubCutaneous at bedtime  isosorbide   mononitrate ER Tablet (IMDUR) 60 milliGRAM(s) Oral daily  metoprolol tartrate 50 milliGRAM(s) Oral two times a day  nicotine - 21 mG/24Hr(s) Patch 1 Patch Transdermal daily  nystatin Powder 1 Application(s) Topical two times a day  pantoprazole  Injectable 40 milliGRAM(s) IV Push two times a day    MEDICATIONS  (PRN):  dextrose Oral Gel 15 Gram(s) Oral once PRN Blood Glucose LESS THAN 70 milliGRAM(s)/deciliter      CAPILLARY BLOOD GLUCOSE        I&O's Summary      Vital Signs Last 24 Hrs  T(C): 36.5 (29 Sep 2022 05:25), Max: 36.9 (28 Sep 2022 15:45)  T(F): 97.7 (29 Sep 2022 05:25), Max: 98.5 (28 Sep 2022 15:45)  HR: 62 (29 Sep 2022 05:25) (60 - 67)  BP: 132/69 (29 Sep 2022 05:25) (96/61 - 132/69)  BP(mean): 70 (28 Sep 2022 19:30) (70 - 70)  RR: 18 (29 Sep 2022 05:25) (18 - 20)  SpO2: 99% (29 Sep 2022 05:25) (94% - 99%)    Parameters below as of 29 Sep 2022 05:25  Patient On (Oxygen Delivery Method): room air        PHYSICAL EXAM:  GENERAL: Chronically ill-appearing, non-toxic  HEAD: Atraumatic, Normocephalic  EYES: EOMI, conjunctiva and sclera clear  NECK: Supple, no tracheal deviation  CHEST/LUNG: Clear to auscultation bilaterally; No wheezes or crackles  HEART: Normal S1/S2; Regular rate and rhythm; No murmurs, rubs, or gallops  ABDOMEN: Soft, Nontender, Nondistended; Bowel sounds present  : ?Tinea cruris in groin area  EXTREMITIES: 2+ Peripheral Pulses; No clubbing, cyanosis, or edema  PSYCH: A&Ox3, flat affect  NEUROLOGY: no focal neurologic deficit    LABS:                        11.2   5.39  )-----------( 160      ( 28 Sep 2022 22:13 )             35.7      09-28    136  |  104  |  22  ----------------------------<  198<H>  3.6   |  22  |  0.69    Ca    9.2      28 Sep 2022 16:56  Phos  2.4     09-28  Mg     2.0     09-28    TPro  6.4  /  Alb  4.1  /  TBili  0.3  /  DBili  x   /  AST  17  /  ALT  13  /  AlkPhos  87  09-28          Urinalysis Basic - ( 28 Sep 2022 20:45 )    Color: Yellow / Appearance: Clear / SG: >1.050 / pH: x  Gluc: x / Ketone: Negative  / Bili: Negative / Urobili: 2 mg/dL   Blood: x / Protein: 30 mg/dL / Nitrite: Negative   Leuk Esterase: Negative / RBC: 1 /hpf / WBC 4 /HPF   Sq Epi: x / Non Sq Epi: 1 /hpf / Bacteria: Negative        RADIOLOGY & ADDITIONAL TESTS:    Imaging Personally Reviewed:    Consultant(s) Notes Reviewed:      Care Discussed with Consultants/Other Providers:   Patient is a 61y old  Male who presents with a chief complaint of N/V with blood streaks/ coffee grounds (?) (28 Sep 2022 23:43)      SUBJECTIVE / OVERNIGHT EVENTS: Patient seen and examined at bedside, notes that he had blood and pus in both emesis and diarrhea. Has not had further episodes of either since admission. Denies CP, SOB, palpitations, abdominal pain, headache, dizziness.     MEDICATIONS  (STANDING):  albuterol/ipratropium for Nebulization 3 milliLiter(s) Nebulizer every 6 hours  atorvastatin 80 milliGRAM(s) Oral at bedtime  busPIRone 15 milliGRAM(s) Oral three times a day  dextrose 5%. 1000 milliLiter(s) (50 mL/Hr) IV Continuous <Continuous>  dextrose 5%. 1000 milliLiter(s) (100 mL/Hr) IV Continuous <Continuous>  dextrose 50% Injectable 25 Gram(s) IV Push once  dextrose 50% Injectable 12.5 Gram(s) IV Push once  dextrose 50% Injectable 25 Gram(s) IV Push once  glucagon  Injectable 1 milliGRAM(s) IntraMuscular once  hydrOXYzine hydrochloride 25 milliGRAM(s) Oral three times a day  influenza   Vaccine 0.5 milliLiter(s) IntraMuscular once  insulin lispro (ADMELOG) corrective regimen sliding scale   SubCutaneous three times a day before meals  insulin lispro (ADMELOG) corrective regimen sliding scale   SubCutaneous at bedtime  isosorbide   mononitrate ER Tablet (IMDUR) 60 milliGRAM(s) Oral daily  metoprolol tartrate 50 milliGRAM(s) Oral two times a day  nicotine - 21 mG/24Hr(s) Patch 1 Patch Transdermal daily  nystatin Powder 1 Application(s) Topical two times a day  pantoprazole  Injectable 40 milliGRAM(s) IV Push two times a day    MEDICATIONS  (PRN):  dextrose Oral Gel 15 Gram(s) Oral once PRN Blood Glucose LESS THAN 70 milliGRAM(s)/deciliter      CAPILLARY BLOOD GLUCOSE        I&O's Summary      Vital Signs Last 24 Hrs  T(C): 36.5 (29 Sep 2022 05:25), Max: 36.9 (28 Sep 2022 15:45)  T(F): 97.7 (29 Sep 2022 05:25), Max: 98.5 (28 Sep 2022 15:45)  HR: 62 (29 Sep 2022 05:25) (60 - 67)  BP: 132/69 (29 Sep 2022 05:25) (96/61 - 132/69)  BP(mean): 70 (28 Sep 2022 19:30) (70 - 70)  RR: 18 (29 Sep 2022 05:25) (18 - 20)  SpO2: 99% (29 Sep 2022 05:25) (94% - 99%)    Parameters below as of 29 Sep 2022 05:25  Patient On (Oxygen Delivery Method): room air        PHYSICAL EXAM:  GENERAL: Non-toxic appearing, lying in bed  HEAD: Atraumatic, Normocephalic  EYES: EOMI, conjunctiva and sclera clear  NECK: Supple, no tracheal deviation  CHEST/LUNG: Clear to auscultation bilaterally; No wheezes or crackles. Healing old vesicles? seen in midsternal region  HEART: Normal S1/S2; Regular rate and rhythm; No murmurs, rubs, or gallops  ABDOMEN: Soft, Nontender, Nondistended; Bowel sounds present  : ?Tinea cruris in groin area  EXTREMITIES: 2+ Peripheral Pulses; No clubbing, cyanosis, or edema  PSYCH: A&Ox3, flat affect  NEUROLOGY: no focal neurologic deficit    LABS:                        11.2   5.39  )-----------( 160      ( 28 Sep 2022 22:13 )             35.7      09-28    136  |  104  |  22  ----------------------------<  198<H>  3.6   |  22  |  0.69    Ca    9.2      28 Sep 2022 16:56  Phos  2.4     09-28  Mg     2.0     09-28    TPro  6.4  /  Alb  4.1  /  TBili  0.3  /  DBili  x   /  AST  17  /  ALT  13  /  AlkPhos  87  09-28          Urinalysis Basic - ( 28 Sep 2022 20:45 )    Color: Yellow / Appearance: Clear / SG: >1.050 / pH: x  Gluc: x / Ketone: Negative  / Bili: Negative / Urobili: 2 mg/dL   Blood: x / Protein: 30 mg/dL / Nitrite: Negative   Leuk Esterase: Negative / RBC: 1 /hpf / WBC 4 /HPF   Sq Epi: x / Non Sq Epi: 1 /hpf / Bacteria: Negative        RADIOLOGY & ADDITIONAL TESTS:    Imaging Personally Reviewed:    Consultant(s) Notes Reviewed:      Care Discussed with Consultants/Other Providers:

## 2022-09-29 NOTE — CONSULT NOTE ADULT - ATTENDING COMMENTS
The patient was seen and examined with the Cardiology Consultation Teaching Service.     He is a 61-year-old man with coronary artery disease, prior PCI, atrial fibrillation, and pacemaker implantation for sinus bradycardia who presented with recurrent hematemesis and bloody bowel movements. We are consulted for greg-procedure risk stratification prior to endoscopy/colonoscopy.    No chest pain  No dyspnea, orthopnea or PND  No palpitations, dizziness or recent syncope    The patient reports good exertional tolerance without difficulty.  He can walk several flights of stairs without chest pain or dyspnea.    PMH/PSH:  Coronary artery disease  Prior PCI, last several years ago  Pacemaker implantation for sinus bradycardia  Atrial fibrillation  COPD  Chronic psychiatric illness    Comfortable-appearing man in no acute distress  Alert and oriented  Afebrile  Vital signs stable  JVP is not elevated  Clear lungs  Irregularly irregular  Normal heart sounds  Soft, non-tender, non-distended abdomen  Extremities are warm and perfused  No peripheral edema    Normocytic anemia      ECG demonstrates sinus rhythm and atrial pacing, PACs, no ST-segment deviation, ST depressions in inferior and lateral leads; last ECG in our system had similar ST-segment changes, likely related to LVH    CXR demonstrates clear lungs    Impression and Recommendations:   61-year-old man with coronary artery disease, prior PCI, atrial fibrillation, and pacemaker implantation for sinus bradycardia who presented with recurrent hematemesis and bloody bowel movements. We are consulted for greg-procedure risk stratification prior to endoscopy/colonoscopy.    There are no absolute contraindications to the planned procedure. There is no evidence of ongoing myocardial ischemia, decompensated heart failure or unstable cardiac arrhythmias.     The patient's Revised Cardiac Risk Index estimates a 6.0% 30-day risk of death, MI or cardiac arrest. The patient's [place] Perioperative Risk is 0.4% for 30-day risk of myocardial infarction or cardiac arrest. These calculated risks may over-estimate risk for non-surgical procedures that do not require general anesthesia.     These risk estimates should be incorporated into a shared decision making conversation between the patient or their surrogate and the performing practitioner regarding the risks, benefits and alternatives to the procedure and whether to proceed. Additional cardiac testing is unlikely to change this risk assessment or procedural outcomes from a cardiac perspective.     Continue aspirin, atorvastatin and metoprolol. Dual antiplatelet therapy is likely safe to interrupt if needed given his stents were placed over one year ago.     Vu Price MD  Cardiology  [phone]
61M, extesnsive cad hx on DAPT, GI consulted for hematemesis and hematochezia as outpatient.   Hgb 12.     Please send infectious w/u as above   IV PPI BID   clear liquid diet   Please obtain cardiac clearance  If/When cleared by cardiology, will plan for egd/colonoscopy.     GI to follow, please call with questions

## 2022-09-29 NOTE — PROGRESS NOTE ADULT - PROBLEM SELECTOR PLAN 5
FADI for now with GI bleeding precludes pharmacologic DVT prophylaxis. Med rec complete, patient started on home psychiatric medications

## 2022-09-29 NOTE — PROGRESS NOTE ADULT - ASSESSMENT
NIGHT HOSPITALIST:     Presentation of patient with upper GI bleed in the setting of N/V (unclear to prior treatment with Cipro/Flagyl but patient with no clinical evidence of colitis) with patient on ASA and Plavix (unclear to last cath PCI but patient reports >2 years ago), with PPM, with presently stable hemodynamics but would follow orthostatics.    PPI IV.   Would consider formal GI evaluation in the AM for possible endoscopy.   Clears for now.    Would consider formal cardiology evaluation in the AM on the issue of patient's dual antiplatelet therapy in the context of the upper GI bleed.  EPS assessment for PPM.    Will follow FS S/S for patient's hyperglycemia.   HgBA1C.  Urine with proteins>>will send urine protein/Cr assay.    Would clarify in the AM from the patient's  pharmacy his Effexor Rx with patient now on 225 mg from last Rx of 37.5 in 2018.    Would favor topical over systemic antifungal for now with suspected tinea cruris.    Would consider workup of possible LEFT 7th pathologic fx.  SPEP/IPEP/UPEP ordered. Mr. Leventhal is a 62 YO man with a few week history of intermittent nausea and vomiting with pus and blood in emesis/diarrea, who presents for worsening symptoms.

## 2022-09-29 NOTE — PROGRESS NOTE ADULT - PROBLEM SELECTOR PLAN 6
Transitions of Care Status:  1.  Name of PCP:    Kerwin Mari MD (PCP) [phone]  2.  PCP Contacted on Admission: [ ] Y    [x ] N    3.  PCP contacted at Discharge: [ ] Y    [ ] N    [ ] N/A  4.  Post-Discharge Appointment Date and Location:  5.  Summary of Handoff given to PCP: Diet: Clears, NPO after midnight  DVT prophylaxis: No chemical prophylaxis as having active bleed  Dispo: pending course

## 2022-09-29 NOTE — PROGRESS NOTE ADULT - PROBLEM SELECTOR PLAN 1
See above.  Clears.   IV PPI.  Consider formal GI evaluation in the AM>>>I have requested a GI consult via [facility] Email. Unclear origin, likely upper GI bleed brisk enough to remain as red streaks in stool. Patient notes pus alongside blood ?infectious trigger vs. inflammation  - GI on board - planned for EGD/colonoscopy 9/30  - Stool studies sent - PCR, cultures, C. diff toxin, calprotectin  - Started on clear diet, will be NPO after midnight and pre-procedure labs will be drawn at 2 AM

## 2022-09-29 NOTE — PROGRESS NOTE ADULT - PROBLEM SELECTOR PLAN 4
See above.   Would clarify patient's medications (including the Effexor XR in the AM) Hgb A1c 6.2, patient with no previous diagnosis of DM  - Low ISS today, will monitor FS and may discontinue if BG remain low

## 2022-09-29 NOTE — CONSULT NOTE ADULT - ASSESSMENT
All recommendations are tentative until note is attested by attending.     Jaylene Means, PGY-4   Gastroenterology/Hepatology Fellow  Available on [employer] Teams  [phone] ([facility] Short Range Pager)  [phone] ([facility] Long Range Pager)    After 5pm, please contact the on-call GI fellow. [phone]   Recommendations:   - cardiac clearance for EGD and colonoscopy   - IV PPI BID   - CLD today   - rest of care per primary team     All recommendations are tentative until note is attested by attending.     Jaylene Means, PGY-4   Gastroenterology/Hepatology Fellow  Available on Microsoft Teams  [phone] ([facility] Short Range Pager)  [phone] ([facility] Long Range Pager)    After 5pm, please contact the on-call GI fellow. [phone] 61-year-old male with history of CAD s/p 6-7 stents (last placed in 2020) on asa and plavix who presents with stomach pain and "blood and pus" in vomit and stool.     #Diarrhea   Patient with diarrhea in the last week but with usual constipation (2-3x/week). DDx includes run off diarrhea, infectious diarrhea, inflammation.     #hematemesis- unclear etiology ddx includes PUD, esophagitis/gastritis/duodenitis, janna farmer tear; less likely is masses or dieulafoy lesion.    - Hgb stable at 12     Recommendations:   - GI PCR stool studies   - fecal calprotectin  - c diff   - cardiac clearance for EGD and colonoscopy   - IV PPI BID   - CLD today   - NPO at midnight  - rest of care per primary team     Instructions for colonoscopy  - clear liquid diet today, NPO at midnight  - please ensure golytely is completed (to be ordered by GI fellow)  - please ensure patient drinks entire prep  - please ensure morning labs are drawn at 2am, electrolytes repleted as necessary, and Hg>7  - rest of care per primary team    All recommendations are tentative until note is attested by attending.     Jaylene Means, PGY-4   Gastroenterology/Hepatology Fellow  Available on [employer] Teams  [phone] ([facility] Short Range Pager)  [phone] ([facility] Long Range Pager)    After 5pm, please contact the on-call GI fellow. [phone]

## 2022-09-29 NOTE — CONSULT NOTE ADULT - ASSESSMENT
62 yo M CAD with multiple PCI (unclear to last PCI but patient reports 2 years ago?), + PPM, COPD with patient still smoking 2 packs/day, with patient reportedly being treated by his PCP with Cipro/Flagyl but with N/V with blood streaks/coffee grounds (?). Pt states he has had several PCIs in the past, most recently 2020, and follows with Dr. Goldberg at [facility]. He also has a PPM for a history of Afib and sinus bradycardia. He has been on ASA and Plavix but not on AC. He denies any cardiac symptoms presently, no chest pain, SOB/ISIDRO, palpitations, lightheadedness/dizziness/presyncope. Exam unremarkable, no signs of volume overload. Has been hemodynamically stable. EKG shows A-pacing with PACs.    - no further workup or management that would preclude GI procedure; should proceed with endoscopy/colonoscopy as indicated  - continue ASA, Plavix pending further GI workup   - continue Lipitor 80 qd  - continue Lopressor 50 bid

## 2022-09-29 NOTE — PROGRESS NOTE ADULT - PROBLEM SELECTOR PLAN 2
Will defer issue of ASA and Plavix for review by the Daytime Provider.  Presently cardiac status compensated.  Consider formal cardiology evaluation in the AM. History of multiple PCIs, PPM, no current cardiac symptoms. Outpatient cardiologist is Dr. Douglas Goldberg who does not practice at [facility], therefore house cardiology consulted  - Clearance obtained for EGD/colonoscopy  - Held asa/Plavix in setting of active GI bleed  - Will follow up additional cardiology recommendations

## 2022-09-29 NOTE — CONSULT NOTE ADULT - SUBJECTIVE AND OBJECTIVE BOX
Patient seen and evaluated at bedside    Reason for consult: pre-procedural risk assessment    HPI:  NIGHT HOSPITALIST:   Patient UNKNOWN to me previously, assigned to me at this point via the ER to admit this 62 y/o M--followed by his PCP above--family aware of admission (patient lives with his mother) but the patient did not wish for examiner to contact family at this hour--patient with a history of anxiety / depression, compensated schizophrenia maintained on Buspar, hydroxyzine, Effexor XR (patient reports he is on 225 mg daily), CAD with multiple PCI (unclear to last PCI but patient reports 2 years ago?), + PPM, COPD with patient still smoking 2 packs/day, with patient reportedly being treated by his PCP with Cipro/Flagyl but with N/V with blood streaks/coffee grounds (?).  No abdominal pain.  NO LOC or dizziness.  Patient with loose but formed BM, denies fever, chills, rigors.   NO weight loss but with limited PO due to N/V.  NO diaphoresis.   NO palliative or exacerbating factors.  NO NSAID use. (28 Sep 2022 23:43)      PMHx:   Anxiety    Chronic bronchitis    Smoker    GERD (gastroesophageal reflux disease)    Hypertriglyceridemia    HLD (hyperlipidemia)    Smoker    Schizophrenia    ОЛЬГА (obstructive sleep apnea)    Tourettes syndrome    COPD (chronic obstructive pulmonary disease)    DM (diabetes mellitus)        PSHx:   No significant past surgical history    Cardiac pacemaker        Allergies:  No Known Allergies      Home Meds:    Current Medications:   acetaminophen     Tablet .. 975 milliGRAM(s) Oral once  albuterol/ipratropium for Nebulization 3 milliLiter(s) Nebulizer every 6 hours  atorvastatin 80 milliGRAM(s) Oral at bedtime  busPIRone 15 milliGRAM(s) Oral three times a day  dextrose 5%. 1000 milliLiter(s) IV Continuous <Continuous>  dextrose 5%. 1000 milliLiter(s) IV Continuous <Continuous>  dextrose 50% Injectable 25 Gram(s) IV Push once  dextrose 50% Injectable 12.5 Gram(s) IV Push once  dextrose 50% Injectable 25 Gram(s) IV Push once  dextrose Oral Gel 15 Gram(s) Oral once PRN  glucagon  Injectable 1 milliGRAM(s) IntraMuscular once  hydrOXYzine hydrochloride 25 milliGRAM(s) Oral three times a day  influenza   Vaccine 0.5 milliLiter(s) IntraMuscular once  insulin lispro (ADMELOG) corrective regimen sliding scale   SubCutaneous three times a day before meals  insulin lispro (ADMELOG) corrective regimen sliding scale   SubCutaneous at bedtime  isosorbide   mononitrate ER Tablet (IMDUR) 60 milliGRAM(s) Oral daily  metoprolol tartrate 50 milliGRAM(s) Oral two times a day  nicotine - 21 mG/24Hr(s) Patch 1 Patch Transdermal daily  nystatin Powder 1 Application(s) Topical two times a day  pantoprazole  Injectable 40 milliGRAM(s) IV Push two times a day  polyethylene glycol/electrolyte Solution. 4000 milliLiter(s) Oral once      FAMILY HISTORY:  Family history of heart attack (Father)  father  MI age 57        Social History:  Smoking History:  Alcohol Use:  Drug Use:    Review of Systems:  REVIEW OF SYSTEMS:  CONSTITUTIONAL: No weakness, fevers or chills  EYES/ENT: No visual changes;  No dysphagia  NECK: No pain or stiffness  RESPIRATORY: No cough, wheezing, hemoptysis; No shortness of breath  CARDIOVASCULAR: No chest pain or palpitations; No lower extremity edema  GASTROINTESTINAL: No abdominal or epigastric pain. No nausea, vomiting, or hematemesis; No diarrhea or constipation. No melena or hematochezia.  BACK: No back pain  GENITOURINARY: No dysuria, frequency or hematuria  NEUROLOGICAL: No numbness or weakness  SKIN: No itching, burning, rashes, or lesions   All other review of systems is negative unless indicated above.    [ ] All other systems negative  [ ] Unable to assess ROS due to    Physical Exam:  T(F): 97 (-), Max: 98 ()  HR: 61 () (57 - 67)  BP: 104/67 () (104/67 - 139/80)  RR: 16 (-)  SpO2: 98% ()  GENERAL: No acute distress, well-developed  HEAD:  Atraumatic, Normocephalic  ENT: EOMI, PERRLA, conjunctiva and sclera clear, Neck supple, No JVD, moist mucosa  CHEST/LUNG: Clear to auscultation bilaterally; No wheeze, equal breath sounds bilaterally   BACK: No spinal tenderness  HEART: Regular rate and rhythm; No murmurs, rubs, or gallops  ABDOMEN: Soft, Nontender, Nondistended; Bowel sounds present  EXTREMITIES:  No clubbing, cyanosis, or edema  PSYCH: Nl behavior, nl affect  NEUROLOGY: AAOx3, non-focal, cranial nerves intact  SKIN: Normal color, No rashes or lesions  LINES:    Cardiovascular Diagnostic Testing:    CXR: Personally reviewed    Labs: Personally reviewed                        12.8   6.86  )-----------( 162      ( 29 Sep 2022 15:01 )             40.1         140  |  108  |  18  ----------------------------<  93  4.2   |  23  |  0.61    Ca    8.8      29 Sep 2022 07:30  Phos  3.1       Mg     2.0         TPro  6.0  /  Alb  3.7  /  TBili  0.3  /  DBili  x   /  AST  17  /  ALT  12  /  AlkPhos  78               Patient seen and evaluated at bedside    Reason for consult: pre-procedural risk assessment    HPI:  NIGHT HOSPITALIST:   Patient UNKNOWN to me previously, assigned to me at this point via the ER to admit this 60 y/o M--followed by his PCP above--family aware of admission (patient lives with his mother) but the patient did not wish for examiner to contact family at this hour--patient with a history of anxiety / depression, compensated schizophrenia maintained on Buspar, hydroxyzine, Effexor XR (patient reports he is on 225 mg daily), CAD with multiple PCI (unclear to last PCI but patient reports 2 years ago?), + PPM, COPD with patient still smoking 2 packs/day, with patient reportedly being treated by his PCP with Cipro/Flagyl but with N/V with blood streaks/coffee grounds (?).  No abdominal pain.  NO LOC or dizziness.  Patient with loose but formed BM, denies fever, chills, rigors.   NO weight loss but with limited PO due to N/V.  NO diaphoresis.   NO palliative or exacerbating factors.  NO NSAID use. (28 Sep 2022 23:43)    Pt states he has had several PCIs in the past, most recently , and follows with Dr. Goldberg at [facility]. He also has a PPM for a history of Afib and sinus bradycardia. He has been on ASA and Plavix but not on AC. He denies any cardiac symptoms presently, no chest pain, SOB/ISIDRO, palpitations, lightheadedness/dizziness/presyncope. EKG shows A-pacing with PACs.      PMHx:   Anxiety    Chronic bronchitis    Smoker    GERD (gastroesophageal reflux disease)    Hypertriglyceridemia    HLD (hyperlipidemia)    Smoker    Schizophrenia    ОЛЬГА (obstructive sleep apnea)    Tourettes syndrome    COPD (chronic obstructive pulmonary disease)    DM (diabetes mellitus)        PSHx:   No significant past surgical history    Cardiac pacemaker        Allergies:  No Known Allergies      Home Meds:    Current Medications:   acetaminophen     Tablet .. 975 milliGRAM(s) Oral once  albuterol/ipratropium for Nebulization 3 milliLiter(s) Nebulizer every 6 hours  atorvastatin 80 milliGRAM(s) Oral at bedtime  busPIRone 15 milliGRAM(s) Oral three times a day  dextrose 5%. 1000 milliLiter(s) IV Continuous <Continuous>  dextrose 5%. 1000 milliLiter(s) IV Continuous <Continuous>  dextrose 50% Injectable 25 Gram(s) IV Push once  dextrose 50% Injectable 12.5 Gram(s) IV Push once  dextrose 50% Injectable 25 Gram(s) IV Push once  dextrose Oral Gel 15 Gram(s) Oral once PRN  glucagon  Injectable 1 milliGRAM(s) IntraMuscular once  hydrOXYzine hydrochloride 25 milliGRAM(s) Oral three times a day  influenza   Vaccine 0.5 milliLiter(s) IntraMuscular once  insulin lispro (ADMELOG) corrective regimen sliding scale   SubCutaneous three times a day before meals  insulin lispro (ADMELOG) corrective regimen sliding scale   SubCutaneous at bedtime  isosorbide   mononitrate ER Tablet (IMDUR) 60 milliGRAM(s) Oral daily  metoprolol tartrate 50 milliGRAM(s) Oral two times a day  nicotine - 21 mG/24Hr(s) Patch 1 Patch Transdermal daily  nystatin Powder 1 Application(s) Topical two times a day  pantoprazole  Injectable 40 milliGRAM(s) IV Push two times a day  polyethylene glycol/electrolyte Solution. 4000 milliLiter(s) Oral once      FAMILY HISTORY:  Family history of heart attack (Father)  father  MI age 57        Social History:  Smoking History:  Alcohol Use:  Drug Use:    Review of Systems:  REVIEW OF SYSTEMS:  CONSTITUTIONAL: No weakness, fevers or chills  EYES/ENT: No visual changes;  No dysphagia  NECK: No pain or stiffness  RESPIRATORY: No cough, wheezing, hemoptysis; No shortness of breath  CARDIOVASCULAR: No chest pain or palpitations; No lower extremity edema  GASTROINTESTINAL: No abdominal or epigastric pain. No nausea, vomiting, or hematemesis; No diarrhea or constipation. No melena or hematochezia.  BACK: No back pain  GENITOURINARY: No dysuria, frequency or hematuria  NEUROLOGICAL: No numbness or weakness  SKIN: No itching, burning, rashes, or lesions   All other review of systems is negative unless indicated above.    [ ] All other systems negative  [ ] Unable to assess ROS due to    Physical Exam:  T(F): 97 (-), Max: 98 ()  HR: 61 () (57 - 67)  BP: 104/67 () (104/67 - 139/80)  RR: 16 (-)  SpO2: 98% (-)  GENERAL: No acute distress, well-developed  HEAD:  Atraumatic, Normocephalic  ENT: EOMI, PERRLA, conjunctiva and sclera clear, Neck supple, No JVD, moist mucosa  CHEST/LUNG: Clear to auscultation bilaterally; No wheeze, equal breath sounds bilaterally   BACK: No spinal tenderness  HEART: Regular rate and rhythm; No murmurs, rubs, or gallops  ABDOMEN: Soft, Nontender, Nondistended; Bowel sounds present  EXTREMITIES:  No clubbing, cyanosis, or edema  PSYCH: Nl behavior, nl affect  NEUROLOGY: AAOx3, non-focal, cranial nerves intact  SKIN: Normal color, No rashes or lesions  LINES:    Cardiovascular Diagnostic Testing:    CXR: Personally reviewed    Labs: Personally reviewed                        12.8   6.86  )-----------( 162      ( 29 Sep 2022 15:01 )             40.1         140  |  108  |  18  ----------------------------<  93  4.2   |  23  |  0.61    Ca    8.8      29 Sep 2022 07:30  Phos  3.1       Mg     2.0         TPro  6.0  /  Alb  3.7  /  TBili  0.3  /  DBili  x   /  AST  17  /  ALT  12  /  AlkPhos  78

## 2022-09-29 NOTE — PROGRESS NOTE ADULT - PROBLEM SELECTOR PLAN 3
See above.   FS S/S.  Urine protein/CR ratio. Patient notes he has had multiple rib fractures and vertebral fractures in the past  - Will work up for multiple myeloma with protein electrophoresis

## 2022-09-29 NOTE — CONSULT NOTE ADULT - SUBJECTIVE AND OBJECTIVE BOX
HPI:    Allergies:  No Known Allergies      Home Medications:    Hospital Medications:  albuterol/ipratropium for Nebulization 3 milliLiter(s) Nebulizer every 6 hours  atorvastatin 80 milliGRAM(s) Oral at bedtime  busPIRone 15 milliGRAM(s) Oral three times a day  dextrose 5%. 1000 milliLiter(s) IV Continuous <Continuous>  dextrose 5%. 1000 milliLiter(s) IV Continuous <Continuous>  dextrose 50% Injectable 25 Gram(s) IV Push once  dextrose 50% Injectable 12.5 Gram(s) IV Push once  dextrose 50% Injectable 25 Gram(s) IV Push once  dextrose Oral Gel 15 Gram(s) Oral once PRN  glucagon  Injectable 1 milliGRAM(s) IntraMuscular once  hydrOXYzine hydrochloride 25 milliGRAM(s) Oral three times a day  influenza   Vaccine 0.5 milliLiter(s) IntraMuscular once  insulin lispro (ADMELOG) corrective regimen sliding scale   SubCutaneous three times a day before meals  insulin lispro (ADMELOG) corrective regimen sliding scale   SubCutaneous at bedtime  isosorbide   mononitrate ER Tablet (IMDUR) 60 milliGRAM(s) Oral daily  metoprolol tartrate 50 milliGRAM(s) Oral two times a day  nicotine - 21 mG/24Hr(s) Patch 1 Patch Transdermal daily  nystatin Powder 1 Application(s) Topical two times a day  pantoprazole  Injectable 40 milliGRAM(s) IV Push two times a day      PMHX/PSHX:  Anxiety    Chronic bronchitis    Smoker    GERD (gastroesophageal reflux disease)    Hypertriglyceridemia    HLD (hyperlipidemia)    Smoker    Schizophrenia    ОЛЬГА (obstructive sleep apnea)    Tourettes syndrome    COPD (chronic obstructive pulmonary disease)    DM (diabetes mellitus)    No significant past surgical history    Cardiac pacemaker        Family history:  Family history of heart attack (Father)        Denies family history of colon cancer/polyps, stomach cancer/polyps, pancreatic cancer/masses, liver cancer/disease, ovarian cancer and endometrial cancer.    Social History:   Tob: Denies  EtOH: Denies  Illicit Drugs: Denies    ROS:     General:  No wt loss, fevers, chills, night sweats, fatigue  Eyes:  Good vision, no reported pain  ENT:  No sore throat, pain, runny nose, dysphagia  CV:  No pain, palpitations, hypo/hypertension  Pulm:  No dyspnea, cough, tachypnea, wheezing  GI:  see HPI  :  No pain, bleeding, incontinence, nocturia  Muscle:  No pain, weakness  Neuro:  No weakness, tingling, memory problems  Psych:  No fatigue, insomnia, mood problems, depression  Endocrine:  No polyuria, polydipsia, cold/heat intolerance  Heme:  No petechiae, ecchymosis, easy bruisability  Skin:  No rash, tattoos, scars, edema    PHYSICAL EXAM:     GENERAL:  No acute distress  HEENT:  NCAT, no scleral icterus   CHEST:  no respiratory distress  HEART:  Regular rate and rhythm  ABDOMEN:  Soft, non-tender, non-distended, normoactive bowel sounds,  no masses  EXTREMITIES: No edema  SKIN:  No rash/erythema/ecchymoses/petechiae/wounds/abscess/warm/dry  NEURO:  Alert and oriented x 3, no asterixis    Vital Signs:  Vital Signs Last 24 Hrs  T(C): 36.5 (29 Sep 2022 05:25), Max: 36.9 (28 Sep 2022 15:45)  T(F): 97.7 (29 Sep 2022 05:25), Max: 98.5 (28 Sep 2022 15:45)  HR: 62 (29 Sep 2022 05:25) (60 - 67)  BP: 132/69 (29 Sep 2022 05:25) (96/61 - 132/69)  BP(mean): 70 (28 Sep 2022 19:30) (70 - 70)  RR: 18 (29 Sep 2022 05:25) (18 - 20)  SpO2: 99% (29 Sep 2022 05:25) (94% - 99%)    Parameters below as of 29 Sep 2022 05:25  Patient On (Oxygen Delivery Method): room air      Daily Height in cm: 165.1 (28 Sep 2022 15:45)    Daily     LABS:                        11.2   5.39  )-----------( 160      ( 28 Sep 2022 22:13 )             35.7     Mean Cell Volume: 83.8 fl (09-28-22 @ 22:13)    09-28    136  |  104  |  22  ----------------------------<  198<H>  3.6   |  22  |  0.69    Ca    9.2      28 Sep 2022 16:56  Phos  2.4     09-28  Mg     2.0     09-28    TPro  6.4  /  Alb  4.1  /  TBili  0.3  /  DBili  x   /  AST  17  /  ALT  13  /  AlkPhos  87  09-28    LIVER FUNCTIONS - ( 28 Sep 2022 16:56 )  Alb: 4.1 g/dL / Pro: 6.4 g/dL / ALK PHOS: 87 U/L / ALT: 13 U/L / AST: 17 U/L / GGT: x             Urinalysis Basic - ( 28 Sep 2022 20:45 )    Color: Yellow / Appearance: Clear / SG: >1.050 / pH: x  Gluc: x / Ketone: Negative  / Bili: Negative / Urobili: 2 mg/dL   Blood: x / Protein: 30 mg/dL / Nitrite: Negative   Leuk Esterase: Negative / RBC: 1 /hpf / WBC 4 /HPF   Sq Epi: x / Non Sq Epi: 1 /hpf / Bacteria: Negative      Amylase Serum--      Lipase serum42       Ammonia--                          11.2   5.39  )-----------( 160      ( 28 Sep 2022 22:13 )             35.7                         12.6   6.99  )-----------( 182      ( 28 Sep 2022 16:56 )             39.3       Imaging:             HPI: 61 year old male with history of CAD s/p 6-7 stents (last placed in 2020) on asa and plavix     Allergies:  No Known Allergies      Home Medications:    Hospital Medications:  albuterol/ipratropium for Nebulization 3 milliLiter(s) Nebulizer every 6 hours  atorvastatin 80 milliGRAM(s) Oral at bedtime  busPIRone 15 milliGRAM(s) Oral three times a day  dextrose 5%. 1000 milliLiter(s) IV Continuous <Continuous>  dextrose 5%. 1000 milliLiter(s) IV Continuous <Continuous>  dextrose 50% Injectable 25 Gram(s) IV Push once  dextrose 50% Injectable 12.5 Gram(s) IV Push once  dextrose 50% Injectable 25 Gram(s) IV Push once  dextrose Oral Gel 15 Gram(s) Oral once PRN  glucagon  Injectable 1 milliGRAM(s) IntraMuscular once  hydrOXYzine hydrochloride 25 milliGRAM(s) Oral three times a day  influenza   Vaccine 0.5 milliLiter(s) IntraMuscular once  insulin lispro (ADMELOG) corrective regimen sliding scale   SubCutaneous three times a day before meals  insulin lispro (ADMELOG) corrective regimen sliding scale   SubCutaneous at bedtime  isosorbide   mononitrate ER Tablet (IMDUR) 60 milliGRAM(s) Oral daily  metoprolol tartrate 50 milliGRAM(s) Oral two times a day  nicotine - 21 mG/24Hr(s) Patch 1 Patch Transdermal daily  nystatin Powder 1 Application(s) Topical two times a day  pantoprazole  Injectable 40 milliGRAM(s) IV Push two times a day      PMHX/PSHX:  Anxiety    Chronic bronchitis    Smoker    GERD (gastroesophageal reflux disease)    Hypertriglyceridemia    HLD (hyperlipidemia)    Smoker    Schizophrenia    ОЛЬГА (obstructive sleep apnea)    Tourettes syndrome    COPD (chronic obstructive pulmonary disease)    DM (diabetes mellitus)    No significant past surgical history    Cardiac pacemaker        Family history:  Family history of heart attack (Father)        Denies family history of colon cancer/polyps, stomach cancer/polyps, pancreatic cancer/masses, liver cancer/disease, ovarian cancer and endometrial cancer.    Social History:   Tob: Denies  EtOH: Denies  Illicit Drugs: Denies    ROS:     General:  No wt loss, fevers, chills, night sweats, fatigue  Eyes:  Good vision, no reported pain  ENT:  No sore throat, pain, runny nose, dysphagia  CV:  No pain, palpitations, hypo/hypertension  Pulm:  No dyspnea, cough, tachypnea, wheezing  GI:  see HPI  :  No pain, bleeding, incontinence, nocturia  Muscle:  No pain, weakness  Neuro:  No weakness, tingling, memory problems  Psych:  No fatigue, insomnia, mood problems, depression  Endocrine:  No polyuria, polydipsia, cold/heat intolerance  Heme:  No petechiae, ecchymosis, easy bruisability  Skin:  No rash, tattoos, scars, edema    PHYSICAL EXAM:     GENERAL:  No acute distress  HEENT:  NCAT, no scleral icterus   CHEST:  no respiratory distress  HEART:  Regular rate and rhythm  ABDOMEN:  Soft, non-tender, non-distended, normoactive bowel sounds,  no masses  EXTREMITIES: No edema  SKIN:  No rash/erythema/ecchymoses/petechiae/wounds/abscess/warm/dry  NEURO:  Alert and oriented x 3, no asterixis    Vital Signs:  Vital Signs Last 24 Hrs  T(C): 36.5 (29 Sep 2022 05:25), Max: 36.9 (28 Sep 2022 15:45)  T(F): 97.7 (29 Sep 2022 05:25), Max: 98.5 (28 Sep 2022 15:45)  HR: 62 (29 Sep 2022 05:25) (60 - 67)  BP: 132/69 (29 Sep 2022 05:25) (96/61 - 132/69)  BP(mean): 70 (28 Sep 2022 19:30) (70 - 70)  RR: 18 (29 Sep 2022 05:25) (18 - 20)  SpO2: 99% (29 Sep 2022 05:25) (94% - 99%)    Parameters below as of 29 Sep 2022 05:25  Patient On (Oxygen Delivery Method): room air      Daily Height in cm: 165.1 (28 Sep 2022 15:45)    Daily     LABS:                        11.2   5.39  )-----------( 160      ( 28 Sep 2022 22:13 )             35.7     Mean Cell Volume: 83.8 fl (09-28-22 @ 22:13)    09-28    136  |  104  |  22  ----------------------------<  198<H>  3.6   |  22  |  0.69    Ca    9.2      28 Sep 2022 16:56  Phos  2.4     09-28  Mg     2.0     09-28    TPro  6.4  /  Alb  4.1  /  TBili  0.3  /  DBili  x   /  AST  17  /  ALT  13  /  AlkPhos  87  09-28    LIVER FUNCTIONS - ( 28 Sep 2022 16:56 )  Alb: 4.1 g/dL / Pro: 6.4 g/dL / ALK PHOS: 87 U/L / ALT: 13 U/L / AST: 17 U/L / GGT: x             Urinalysis Basic - ( 28 Sep 2022 20:45 )    Color: Yellow / Appearance: Clear / SG: >1.050 / pH: x  Gluc: x / Ketone: Negative  / Bili: Negative / Urobili: 2 mg/dL   Blood: x / Protein: 30 mg/dL / Nitrite: Negative   Leuk Esterase: Negative / RBC: 1 /hpf / WBC 4 /HPF   Sq Epi: x / Non Sq Epi: 1 /hpf / Bacteria: Negative      Amylase Serum--      Lipase serum42       Ammonia--                          11.2   5.39  )-----------( 160      ( 28 Sep 2022 22:13 )             35.7                         12.6   6.99  )-----------( 182      ( 28 Sep 2022 16:56 )             39.3       Imaging:             HPI: 61 year old male with history of CAD s/p 6-7 stents (last placed in 2020) on asa and plavix who presents with stomach pain and "blood and pus" in vomit and stool.     Patient reports that he had "blood and pus" in his vomitus and stool. Unable to quantify how much blood in emesis. He reports that he had been vomiting 3-4x a day for the past week. Patient also has been having diarrhea with semi-solid stool several times a day, one BM yesterday morning. Reports that he typically is constipated with 2-3 BM per week. He had gone to his PCP and gotten flagyl and cipro, but symptoms did not improve and so he reported to the hospital.     Last endoscopy was in 2018 and reports erosions. Patient last colonoscopy in 2015. Patient reports that in the past, he was told by cardiology that he was not cleared for colonoscopy.     Allergies:  No Known Allergies      Home Medications:    Hospital Medications:  albuterol/ipratropium for Nebulization 3 milliLiter(s) Nebulizer every 6 hours  atorvastatin 80 milliGRAM(s) Oral at bedtime  busPIRone 15 milliGRAM(s) Oral three times a day  dextrose 5%. 1000 milliLiter(s) IV Continuous <Continuous>  dextrose 5%. 1000 milliLiter(s) IV Continuous <Continuous>  dextrose 50% Injectable 25 Gram(s) IV Push once  dextrose 50% Injectable 12.5 Gram(s) IV Push once  dextrose 50% Injectable 25 Gram(s) IV Push once  dextrose Oral Gel 15 Gram(s) Oral once PRN  glucagon  Injectable 1 milliGRAM(s) IntraMuscular once  hydrOXYzine hydrochloride 25 milliGRAM(s) Oral three times a day  influenza   Vaccine 0.5 milliLiter(s) IntraMuscular once  insulin lispro (ADMELOG) corrective regimen sliding scale   SubCutaneous three times a day before meals  insulin lispro (ADMELOG) corrective regimen sliding scale   SubCutaneous at bedtime  isosorbide   mononitrate ER Tablet (IMDUR) 60 milliGRAM(s) Oral daily  metoprolol tartrate 50 milliGRAM(s) Oral two times a day  nicotine - 21 mG/24Hr(s) Patch 1 Patch Transdermal daily  nystatin Powder 1 Application(s) Topical two times a day  pantoprazole  Injectable 40 milliGRAM(s) IV Push two times a day      PMHX/PSHX:  Anxiety    Chronic bronchitis    Smoker    GERD (gastroesophageal reflux disease)    Hypertriglyceridemia    HLD (hyperlipidemia)    Smoker    Schizophrenia    ОЛЬГА (obstructive sleep apnea)    Tourettes syndrome    COPD (chronic obstructive pulmonary disease)    DM (diabetes mellitus)    No significant past surgical history    Cardiac pacemaker        Family history:  Family history of heart attack (Father)        Denies family history of colon cancer/polyps, stomach cancer/polyps, pancreatic cancer/masses, liver cancer/disease, ovarian cancer and endometrial cancer.    Social History:   Tob: Denies  EtOH: Denies  Illicit Drugs: Denies    ROS:     General:  No wt loss, fevers, chills, night sweats, fatigue  Eyes:  Good vision, no reported pain  ENT:  No sore throat, pain, runny nose, dysphagia  CV:  No pain, palpitations, hypo/hypertension  Pulm:  No dyspnea, cough, tachypnea, wheezing  GI:  see HPI  :  No pain, bleeding, incontinence, nocturia  Muscle:  No pain, weakness  Neuro:  No weakness, tingling, memory problems  Psych:  No fatigue, insomnia, mood problems, depression  Endocrine:  No polyuria, polydipsia, cold/heat intolerance  Heme:  No petechiae, ecchymosis, easy bruisability  Skin:  No rash, tattoos, scars, edema    PHYSICAL EXAM:     Declined physical exam   Declined rectal exam     Vital Signs:  Vital Signs Last 24 Hrs  T(C): 36.5 (29 Sep 2022 05:25), Max: 36.9 (28 Sep 2022 15:45)  T(F): 97.7 (29 Sep 2022 05:25), Max: 98.5 (28 Sep 2022 15:45)  HR: 62 (29 Sep 2022 05:25) (60 - 67)  BP: 132/69 (29 Sep 2022 05:25) (96/61 - 132/69)  BP(mean): 70 (28 Sep 2022 19:30) (70 - 70)  RR: 18 (29 Sep 2022 05:25) (18 - 20)  SpO2: 99% (29 Sep 2022 05:25) (94% - 99%)    Parameters below as of 29 Sep 2022 05:25  Patient On (Oxygen Delivery Method): room air      Daily Height in cm: 165.1 (28 Sep 2022 15:45)    Daily     LABS:                        11.2   5.39  )-----------( 160      ( 28 Sep 2022 22:13 )             35.7     Mean Cell Volume: 83.8 fl (09-28-22 @ 22:13)    09-28    136  |  104  |  22  ----------------------------<  198<H>  3.6   |  22  |  0.69    Ca    9.2      28 Sep 2022 16:56  Phos  2.4     09-28  Mg     2.0     09-28    TPro  6.4  /  Alb  4.1  /  TBili  0.3  /  DBili  x   /  AST  17  /  ALT  13  /  AlkPhos  87  09-28    LIVER FUNCTIONS - ( 28 Sep 2022 16:56 )  Alb: 4.1 g/dL / Pro: 6.4 g/dL / ALK PHOS: 87 U/L / ALT: 13 U/L / AST: 17 U/L / GGT: x             Urinalysis Basic - ( 28 Sep 2022 20:45 )    Color: Yellow / Appearance: Clear / SG: >1.050 / pH: x  Gluc: x / Ketone: Negative  / Bili: Negative / Urobili: 2 mg/dL   Blood: x / Protein: 30 mg/dL / Nitrite: Negative   Leuk Esterase: Negative / RBC: 1 /hpf / WBC 4 /HPF   Sq Epi: x / Non Sq Epi: 1 /hpf / Bacteria: Negative      Amylase Serum--      Lipase serum42       Ammonia--                          11.2   5.39  )-----------( 160      ( 28 Sep 2022 22:13 )             35.7                         12.6   6.99  )-----------( 182      ( 28 Sep 2022 16:56 )             39.3       Imaging:

## 2022-09-30 ENCOUNTER — TRANSCRIPTION ENCOUNTER (OUTPATIENT)
Age: 62
End: 2022-09-30

## 2022-09-30 ENCOUNTER — RESULT REVIEW (OUTPATIENT)
Age: 62
End: 2022-09-30

## 2022-09-30 LAB
ALBUMIN SERPL ELPH-MCNC: 3.9 G/DL — SIGNIFICANT CHANGE UP (ref 3.3–5)
ALP SERPL-CCNC: 77 U/L — SIGNIFICANT CHANGE UP (ref 40–120)
ALT FLD-CCNC: 13 U/L — SIGNIFICANT CHANGE UP (ref 10–45)
ANION GAP SERPL CALC-SCNC: 12 MMOL/L — SIGNIFICANT CHANGE UP (ref 5–17)
AST SERPL-CCNC: 16 U/L — SIGNIFICANT CHANGE UP (ref 10–40)
BILIRUB SERPL-MCNC: 0.3 MG/DL — SIGNIFICANT CHANGE UP (ref 0.2–1.2)
BUN SERPL-MCNC: 8 MG/DL — SIGNIFICANT CHANGE UP (ref 7–23)
CALCIUM SERPL-MCNC: 8.9 MG/DL — SIGNIFICANT CHANGE UP (ref 8.4–10.5)
CHLORIDE SERPL-SCNC: 105 MMOL/L — SIGNIFICANT CHANGE UP (ref 96–108)
CO2 SERPL-SCNC: 24 MMOL/L — SIGNIFICANT CHANGE UP (ref 22–31)
CREAT SERPL-MCNC: 0.53 MG/DL — SIGNIFICANT CHANGE UP (ref 0.5–1.3)
CULTURE RESULTS: SIGNIFICANT CHANGE UP
EGFR: 113 ML/MIN/1.73M2 — SIGNIFICANT CHANGE UP
GLUCOSE BLDC GLUCOMTR-MCNC: 106 MG/DL — HIGH (ref 70–99)
GLUCOSE BLDC GLUCOMTR-MCNC: 115 MG/DL — HIGH (ref 70–99)
GLUCOSE BLDC GLUCOMTR-MCNC: 115 MG/DL — HIGH (ref 70–99)
GLUCOSE BLDC GLUCOMTR-MCNC: 93 MG/DL — SIGNIFICANT CHANGE UP (ref 70–99)
GLUCOSE SERPL-MCNC: 112 MG/DL — HIGH (ref 70–99)
HCT VFR BLD CALC: 38.2 % — LOW (ref 39–50)
HCT VFR BLD CALC: 40 % — SIGNIFICANT CHANGE UP (ref 39–50)
HGB BLD-MCNC: 12.1 G/DL — LOW (ref 13–17)
HGB BLD-MCNC: 13.1 G/DL — SIGNIFICANT CHANGE UP (ref 13–17)
MAGNESIUM SERPL-MCNC: 1.9 MG/DL — SIGNIFICANT CHANGE UP (ref 1.6–2.6)
MCHC RBC-ENTMCNC: 26.5 PG — LOW (ref 27–34)
MCHC RBC-ENTMCNC: 26.8 PG — LOW (ref 27–34)
MCHC RBC-ENTMCNC: 31.7 GM/DL — LOW (ref 32–36)
MCHC RBC-ENTMCNC: 32.8 GM/DL — SIGNIFICANT CHANGE UP (ref 32–36)
MCV RBC AUTO: 81.8 FL — SIGNIFICANT CHANGE UP (ref 80–100)
MCV RBC AUTO: 83.6 FL — SIGNIFICANT CHANGE UP (ref 80–100)
NRBC # BLD: 0 /100 WBCS — SIGNIFICANT CHANGE UP (ref 0–0)
NRBC # BLD: 0 /100 WBCS — SIGNIFICANT CHANGE UP (ref 0–0)
PHOSPHATE SERPL-MCNC: 3.6 MG/DL — SIGNIFICANT CHANGE UP (ref 2.5–4.5)
PLATELET # BLD AUTO: 156 K/UL — SIGNIFICANT CHANGE UP (ref 150–400)
PLATELET # BLD AUTO: 168 K/UL — SIGNIFICANT CHANGE UP (ref 150–400)
POTASSIUM SERPL-MCNC: 3.7 MMOL/L — SIGNIFICANT CHANGE UP (ref 3.5–5.3)
POTASSIUM SERPL-SCNC: 3.7 MMOL/L — SIGNIFICANT CHANGE UP (ref 3.5–5.3)
PROT SERPL-MCNC: 6.2 G/DL — SIGNIFICANT CHANGE UP (ref 6–8.3)
RBC # BLD: 4.57 M/UL — SIGNIFICANT CHANGE UP (ref 4.2–5.8)
RBC # BLD: 4.89 M/UL — SIGNIFICANT CHANGE UP (ref 4.2–5.8)
RBC # FLD: 13.9 % — SIGNIFICANT CHANGE UP (ref 10.3–14.5)
RBC # FLD: 14 % — SIGNIFICANT CHANGE UP (ref 10.3–14.5)
SODIUM SERPL-SCNC: 141 MMOL/L — SIGNIFICANT CHANGE UP (ref 135–145)
SPECIMEN SOURCE: SIGNIFICANT CHANGE UP
VIT D25+D1,25 OH+D1,25 PNL SERPL-MCNC: 46.9 PG/ML — SIGNIFICANT CHANGE UP (ref 19.9–79.3)
WBC # BLD: 5.62 K/UL — SIGNIFICANT CHANGE UP (ref 3.8–10.5)
WBC # BLD: 6.67 K/UL — SIGNIFICANT CHANGE UP (ref 3.8–10.5)
WBC # FLD AUTO: 5.62 K/UL — SIGNIFICANT CHANGE UP (ref 3.8–10.5)
WBC # FLD AUTO: 6.67 K/UL — SIGNIFICANT CHANGE UP (ref 3.8–10.5)

## 2022-09-30 PROCEDURE — 45378 DIAGNOSTIC COLONOSCOPY: CPT | Mod: GC

## 2022-09-30 PROCEDURE — 88305 TISSUE EXAM BY PATHOLOGIST: CPT | Mod: 26

## 2022-09-30 PROCEDURE — 44360 SMALL BOWEL ENDOSCOPY: CPT | Mod: GC

## 2022-09-30 PROCEDURE — 99233 SBSQ HOSP IP/OBS HIGH 50: CPT | Mod: GC

## 2022-09-30 RX ORDER — CLOPIDOGREL BISULFATE 75 MG/1
1 TABLET, FILM COATED ORAL
Qty: 0 | Refills: 0 | DISCHARGE

## 2022-09-30 RX ADMIN — ATORVASTATIN CALCIUM 80 MILLIGRAM(S): 80 TABLET, FILM COATED ORAL at 22:52

## 2022-09-30 RX ADMIN — PANTOPRAZOLE SODIUM 40 MILLIGRAM(S): 20 TABLET, DELAYED RELEASE ORAL at 05:51

## 2022-09-30 RX ADMIN — Medication 15 MILLIGRAM(S): at 22:52

## 2022-09-30 RX ADMIN — Medication 3 MILLILITER(S): at 05:51

## 2022-09-30 RX ADMIN — Medication 1 PATCH: at 17:28

## 2022-09-30 RX ADMIN — Medication 15 MILLIGRAM(S): at 05:51

## 2022-09-30 RX ADMIN — NYSTATIN CREAM 1 APPLICATION(S): 100000 CREAM TOPICAL at 05:52

## 2022-09-30 RX ADMIN — Medication 0.5 MILLIGRAM(S): at 23:16

## 2022-09-30 RX ADMIN — Medication 25 MILLIGRAM(S): at 05:51

## 2022-09-30 RX ADMIN — PANTOPRAZOLE SODIUM 40 MILLIGRAM(S): 20 TABLET, DELAYED RELEASE ORAL at 17:25

## 2022-09-30 RX ADMIN — Medication 25 MILLIGRAM(S): at 22:52

## 2022-09-30 RX ADMIN — Medication 50 MILLIGRAM(S): at 05:50

## 2022-09-30 RX ADMIN — ISOSORBIDE MONONITRATE 60 MILLIGRAM(S): 60 TABLET, EXTENDED RELEASE ORAL at 11:50

## 2022-09-30 RX ADMIN — Medication 1 PATCH: at 12:23

## 2022-09-30 RX ADMIN — Medication 3 MILLILITER(S): at 23:14

## 2022-09-30 RX ADMIN — Medication 3 MILLILITER(S): at 11:50

## 2022-09-30 RX ADMIN — Medication 50 MILLIGRAM(S): at 17:32

## 2022-09-30 NOTE — PROGRESS NOTE ADULT - PROBLEM SELECTOR PLAN 1
Unclear origin, likely upper GI bleed brisk enough to remain as red streaks in stool. Patient notes pus alongside blood ?infectious trigger vs. inflammation  - GI on board - planned for EGD/colonoscopy 9/30  - Stool studies sent - PCR, cultures, C. diff toxin, calprotectin  - Started on clear diet, will be NPO after midnight and pre-procedure labs will be drawn at 2 AM Unclear origin, likely upper GI bleed brisk enough to remain as red streaks in stool. Patient notes pus alongside blood ?infectious trigger vs. inflammation  - GI on board - EGD/colonoscopy done 9/30, pending GI recs post-procedure  - Stool studies sent - PCR, cultures, C. diff toxin, calprotectin - pending

## 2022-09-30 NOTE — PROGRESS NOTE ADULT - SUBJECTIVE AND OBJECTIVE BOX
Patient is a 61y old  Male who presents with a chief complaint of N/V with blood streaks/ coffee grounds (?) (29 Sep 2022 18:09)      SUBJECTIVE / OVERNIGHT EVENTS:    MEDICATIONS  (STANDING):  albuterol/ipratropium for Nebulization 3 milliLiter(s) Nebulizer every 6 hours  atorvastatin 80 milliGRAM(s) Oral at bedtime  busPIRone 15 milliGRAM(s) Oral three times a day  dextrose 5%. 1000 milliLiter(s) (50 mL/Hr) IV Continuous <Continuous>  dextrose 5%. 1000 milliLiter(s) (100 mL/Hr) IV Continuous <Continuous>  dextrose 50% Injectable 25 Gram(s) IV Push once  dextrose 50% Injectable 12.5 Gram(s) IV Push once  dextrose 50% Injectable 25 Gram(s) IV Push once  glucagon  Injectable 1 milliGRAM(s) IntraMuscular once  hydrOXYzine hydrochloride 25 milliGRAM(s) Oral three times a day  influenza   Vaccine 0.5 milliLiter(s) IntraMuscular once  insulin lispro (ADMELOG) corrective regimen sliding scale   SubCutaneous three times a day before meals  insulin lispro (ADMELOG) corrective regimen sliding scale   SubCutaneous at bedtime  isosorbide   mononitrate ER Tablet (IMDUR) 60 milliGRAM(s) Oral daily  metoprolol tartrate 50 milliGRAM(s) Oral two times a day  nicotine - 21 mG/24Hr(s) Patch 1 Patch Transdermal daily  nystatin Powder 1 Application(s) Topical two times a day  pantoprazole  Injectable 40 milliGRAM(s) IV Push two times a day    MEDICATIONS  (PRN):  acetaminophen     Tablet .. 650 milliGRAM(s) Oral every 6 hours PRN Temp greater or equal to 38C (100.4F), Moderate Pain (4 - 6)  dextrose Oral Gel 15 Gram(s) Oral once PRN Blood Glucose LESS THAN 70 milliGRAM(s)/deciliter      CAPILLARY BLOOD GLUCOSE      POCT Blood Glucose.: 115 mg/dL (30 Sep 2022 06:42)  POCT Blood Glucose.: 97 mg/dL (29 Sep 2022 21:18)  POCT Blood Glucose.: 120 mg/dL (29 Sep 2022 17:18)  POCT Blood Glucose.: 107 mg/dL (29 Sep 2022 15:00)  POCT Blood Glucose.: 116 mg/dL (29 Sep 2022 08:20)    I&O's Summary    29 Sep 2022 07:01  -  30 Sep 2022 07:00  --------------------------------------------------------  IN: 200 mL / OUT: 250 mL / NET: -50 mL        Vital Signs Last 24 Hrs  T(C): 36.4 (30 Sep 2022 04:38), Max: 36.9 (29 Sep 2022 20:30)  T(F): 97.6 (30 Sep 2022 04:38), Max: 98.5 (29 Sep 2022 20:30)  HR: 62 (30 Sep 2022 04:38) (60 - 62)  BP: 140/52 (30 Sep 2022 04:38) (103/60 - 140/52)  BP(mean): --  RR: 18 (30 Sep 2022 04:38) (16 - 18)  SpO2: 98% (30 Sep 2022 04:38) (97% - 99%)    Parameters below as of 30 Sep 2022 04:38  Patient On (Oxygen Delivery Method): room air        PHYSICAL EXAM:  GENERAL: Non-toxic appearing, lying in bed  HEAD: Atraumatic, Normocephalic  EYES: EOMI, conjunctiva and sclera clear  NECK: Supple, no tracheal deviation  CHEST/LUNG: Clear to auscultation bilaterally; No wheezes or crackles. Healing old vesicles? seen in midsternal region  HEART: Normal S1/S2; Regular rate and rhythm; No murmurs, rubs, or gallops  ABDOMEN: Soft, Nontender, Nondistended; Bowel sounds present  : ?Tinea cruris in groin area  EXTREMITIES: 2+ Peripheral Pulses; No clubbing, cyanosis, or edema  PSYCH: A&Ox3, flat affect  NEUROLOGY: no focal neurologic deficit    LABS:                        12.1   5.62  )-----------( 156      ( 30 Sep 2022 04:37 )             38.2      09-30    141  |  105  |  8   ----------------------------<  112<H>  3.7   |  24  |  0.53    Ca    8.9      30 Sep 2022 04:37  Phos  3.6     09-30  Mg     1.9     09-30    TPro  6.2  /  Alb  3.9  /  TBili  0.3  /  DBili  x   /  AST  16  /  ALT  13  /  AlkPhos  77  09-30          Urinalysis Basic - ( 28 Sep 2022 20:45 )    Color: Yellow / Appearance: Clear / SG: >1.050 / pH: x  Gluc: x / Ketone: Negative  / Bili: Negative / Urobili: 2 mg/dL   Blood: x / Protein: 30 mg/dL / Nitrite: Negative   Leuk Esterase: Negative / RBC: 1 /hpf / WBC 4 /HPF   Sq Epi: x / Non Sq Epi: 1 /hpf / Bacteria: Negative        RADIOLOGY & ADDITIONAL TESTS:    Imaging Personally Reviewed:    Consultant(s) Notes Reviewed:      Care Discussed with Consultants/Other Providers:   Patient is a 61y old  Male who presents with a chief complaint of N/V with blood streaks/ coffee grounds (?) (29 Sep 2022 18:09)      SUBJECTIVE / OVERNIGHT EVENTS: Patient started on prep overnight for colonoscopy, however did not fully complete and required enema. He notes he has had no further episodes of emesis and bowel movements since prep started have been loose, yellow-brown, non-bloody.     MEDICATIONS  (STANDING):  albuterol/ipratropium for Nebulization 3 milliLiter(s) Nebulizer every 6 hours  atorvastatin 80 milliGRAM(s) Oral at bedtime  busPIRone 15 milliGRAM(s) Oral three times a day  dextrose 5%. 1000 milliLiter(s) (50 mL/Hr) IV Continuous <Continuous>  dextrose 5%. 1000 milliLiter(s) (100 mL/Hr) IV Continuous <Continuous>  dextrose 50% Injectable 25 Gram(s) IV Push once  dextrose 50% Injectable 12.5 Gram(s) IV Push once  dextrose 50% Injectable 25 Gram(s) IV Push once  glucagon  Injectable 1 milliGRAM(s) IntraMuscular once  hydrOXYzine hydrochloride 25 milliGRAM(s) Oral three times a day  influenza   Vaccine 0.5 milliLiter(s) IntraMuscular once  insulin lispro (ADMELOG) corrective regimen sliding scale   SubCutaneous three times a day before meals  insulin lispro (ADMELOG) corrective regimen sliding scale   SubCutaneous at bedtime  isosorbide   mononitrate ER Tablet (IMDUR) 60 milliGRAM(s) Oral daily  metoprolol tartrate 50 milliGRAM(s) Oral two times a day  nicotine - 21 mG/24Hr(s) Patch 1 Patch Transdermal daily  nystatin Powder 1 Application(s) Topical two times a day  pantoprazole  Injectable 40 milliGRAM(s) IV Push two times a day    MEDICATIONS  (PRN):  acetaminophen     Tablet .. 650 milliGRAM(s) Oral every 6 hours PRN Temp greater or equal to 38C (100.4F), Moderate Pain (4 - 6)  dextrose Oral Gel 15 Gram(s) Oral once PRN Blood Glucose LESS THAN 70 milliGRAM(s)/deciliter      CAPILLARY BLOOD GLUCOSE      POCT Blood Glucose.: 115 mg/dL (30 Sep 2022 06:42)  POCT Blood Glucose.: 97 mg/dL (29 Sep 2022 21:18)  POCT Blood Glucose.: 120 mg/dL (29 Sep 2022 17:18)  POCT Blood Glucose.: 107 mg/dL (29 Sep 2022 15:00)  POCT Blood Glucose.: 116 mg/dL (29 Sep 2022 08:20)    I&O's Summary    29 Sep 2022 07:01  -  30 Sep 2022 07:00  --------------------------------------------------------  IN: 200 mL / OUT: 250 mL / NET: -50 mL        Vital Signs Last 24 Hrs  T(C): 36.4 (30 Sep 2022 04:38), Max: 36.9 (29 Sep 2022 20:30)  T(F): 97.6 (30 Sep 2022 04:38), Max: 98.5 (29 Sep 2022 20:30)  HR: 62 (30 Sep 2022 04:38) (60 - 62)  BP: 140/52 (30 Sep 2022 04:38) (103/60 - 140/52)  BP(mean): --  RR: 18 (30 Sep 2022 04:38) (16 - 18)  SpO2: 98% (30 Sep 2022 04:38) (97% - 99%)    Parameters below as of 30 Sep 2022 04:38  Patient On (Oxygen Delivery Method): room air        PHYSICAL EXAM:  GENERAL: Non-toxic appearing, lying in bed  HEAD: Atraumatic, Normocephalic  EYES: EOMI, conjunctiva and sclera clear  NECK: Supple, no tracheal deviation  CHEST/LUNG: Clear to auscultation bilaterally; No wheezes or crackles. Healing old vesicles? seen in midsternal region  HEART: Normal S1/S2; Regular rate and rhythm; No murmurs, rubs, or gallops  ABDOMEN: Soft, Nontender, Nondistended; Bowel sounds present  EXTREMITIES: 2+ Peripheral Pulses; No clubbing, cyanosis, or edema  PSYCH: A&Ox3, flat affect  NEUROLOGY: no focal neurologic deficit    LABS:                        12.1   5.62  )-----------( 156      ( 30 Sep 2022 04:37 )             38.2      09-30    141  |  105  |  8   ----------------------------<  112<H>  3.7   |  24  |  0.53    Ca    8.9      30 Sep 2022 04:37  Phos  3.6     09-30  Mg     1.9     09-30    TPro  6.2  /  Alb  3.9  /  TBili  0.3  /  DBili  x   /  AST  16  /  ALT  13  /  AlkPhos  77  09-30          Urinalysis Basic - ( 28 Sep 2022 20:45 )    Color: Yellow / Appearance: Clear / SG: >1.050 / pH: x  Gluc: x / Ketone: Negative  / Bili: Negative / Urobili: 2 mg/dL   Blood: x / Protein: 30 mg/dL / Nitrite: Negative   Leuk Esterase: Negative / RBC: 1 /hpf / WBC 4 /HPF   Sq Epi: x / Non Sq Epi: 1 /hpf / Bacteria: Negative        RADIOLOGY & ADDITIONAL TESTS:    Imaging Personally Reviewed:    Consultant(s) Notes Reviewed:      Care Discussed with Consultants/Other Providers:

## 2022-09-30 NOTE — DISCHARGE NOTE PROVIDER - CARE PROVIDER_API CALL
Kerwin Mari)  Gastroenterology; Internal Medicine  [address] [place] [address]  Phone: [phone]  Fax: [phone]  Follow Up Time:    Kerwin Mari)  Gastroenterology; Internal Medicine  [address] [facility], [address] [place] [address]  Phone: [phone]  Fax: [phone]  Follow Up Time:     GOLDBERG, DOUGLAS A  Cardiology  [address] [facility], [address]  [place] [address]  Phone: [phone]  Fax: [phone]  Established Patient  Follow Up Time: 1 week

## 2022-09-30 NOTE — PHYSICAL THERAPY INITIAL EVALUATION ADULT - ADDITIONAL COMMENTS
Pt stated he lives in a house w/ 2 other room mates, PTA, I with all functional/community mobility and ADls. Owns RW but doesn't use.

## 2022-09-30 NOTE — DISCHARGE NOTE PROVIDER - CARE PROVIDERS DIRECT ADDRESSES
,[email or web address]

## 2022-09-30 NOTE — DISCHARGE NOTE PROVIDER - NSDCMRMEDTOKEN_GEN_ALL_CORE_FT
aspirin 81 mg oral delayed release tablet: 1 tab(s) orally once a day  atorvastatin 80 mg oral tablet: 1 tab(s) orally once a day  busPIRone 15 mg oral tablet: 1 tab(s) orally 3 times a day  Effexor XR: 225 milligram(s) orally once a day  ezetimibe 10 mg oral tablet: 1 tab(s) orally once a day  gabapentin 100 mg oral tablet: 1 milligram(s) orally 3 times a day, As Needed  hydrOXYzine hydrochloride 25 mg oral tablet: 1 tab(s) orally 3 times a day, As Needed  isosorbide mononitrate 60 mg oral tablet, extended release: 1 tab(s) orally once a day (in the morning)  Lopressor 50 mg oral tablet: 1 tab(s) orally 2 times a day  omeprazole 40 mg oral delayed release capsule: 1 cap(s) orally once a day  oxybutynin 10 mg/24 hr oral tablet, extended release: 1 tab(s) orally once a day  risperiDONE 0.5 mg oral tablet: 1 tab(s) orally once a day (at bedtime), As Needed

## 2022-09-30 NOTE — DISCHARGE NOTE PROVIDER - NSDCFUADDAPPT_GEN_ALL_CORE_FT
Please make sure to follow up with your Gastroenterologist 1-2 weeks after being discharged from the hospital. You will need to discuss the role of an outpatient colonoscopy.     Please make sure to follow-up with your primary care doctor 1-2 weeks after being discharged from the hospital for continued medical care. Please make sure to follow up with your Gastroenterologist 1-2 weeks after being discharged from the hospital. You will need to discuss the role of an outpatient colonoscopy. You can use any of the following clinics:  [phone] ([facility] at [address] [place] [address])   [phone] ([facility] at [address] [place] [address])   [phone] ([facility] at [address] [place] [address]).     Please make sure to follow-up with your primary care doctor 1-2 weeks after being discharged from the hospital for continued medical care.

## 2022-09-30 NOTE — PROGRESS NOTE ADULT - PROBLEM SELECTOR PLAN 6
Diet: Clears, NPO after midnight  DVT prophylaxis: No chemical prophylaxis as having active bleed  Dispo: pending course Diet: Clears, NPO after midnight  DVT prophylaxis: No chemical prophylaxis as having active bleed  Dispo: pending course/PT recs

## 2022-09-30 NOTE — DISCHARGE NOTE PROVIDER - NSFOLLOWUPCLINICS_GEN_ALL_ED_FT
[facility]  Gastroenterology  [address] [place] [address] [address]  [place] [address]  Phone: [phone]  Fax:

## 2022-09-30 NOTE — PROGRESS NOTE ADULT - ASSESSMENT
Mr. Leventhal is a 62 YO man with a few week history of intermittent nausea and vomiting with pus and blood in emesis/diarrea, who presents for worsening symptoms.

## 2022-09-30 NOTE — DISCHARGE NOTE PROVIDER - PROVIDER TOKENS
PROVIDER:[TOKEN:[[id number]]] PROVIDER:[TOKEN:[[id number]]],PROVIDER:[TOKEN:[[id number]],FOLLOWUP:[1 week],ESTABLISHEDPATIENT:[T]]

## 2022-09-30 NOTE — PRE PROCEDURE NOTE - PRE PROCEDURE EVALUATION
Attending Physician: Kevin                      Procedure: EGD/colonoscopy    Indication for Procedure: bloody stools  ________________________________________________________  PAST MEDICAL & SURGICAL HISTORY:  Anxiety      Chronic bronchitis      Smoker      GERD (gastroesophageal reflux disease)      Hypertriglyceridemia      HLD (hyperlipidemia)      Smoker      Schizophrenia      ОЛЬГА (obstructive sleep apnea)      Tourettes syndrome      COPD (chronic obstructive pulmonary disease)      DM (diabetes mellitus)  resolved with weight loss      Cardiac pacemaker        ALLERGIES:  No Known Allergies    HOME MEDICATIONS:  aspirin 81 mg oral delayed release tablet: 1 tab(s) orally once a day  atorvastatin 80 mg oral tablet: 1 tab(s) orally once a day  busPIRone 15 mg oral tablet: 1 tab(s) orally 3 times a day  Effexor XR: 225 milligram(s) orally once a day  ezetimibe 10 mg oral tablet: 1 tab(s) orally once a day  gabapentin 100 mg oral tablet: 1 milligram(s) orally 3 times a day, As Needed  hydrOXYzine hydrochloride 25 mg oral tablet: 1 tab(s) orally 3 times a day, As Needed  isosorbide mononitrate 60 mg oral tablet, extended release: 1 tab(s) orally once a day (in the morning)  Lopressor 50 mg oral tablet: 1 tab(s) orally 2 times a day  omeprazole 40 mg oral delayed release capsule: 1 cap(s) orally once a day  oxybutynin 10 mg/24 hr oral tablet, extended release: 1 tab(s) orally once a day  Plavix 75 mg oral tablet: 1 tab(s) orally once a day  risperiDONE 0.5 mg oral tablet: 1 tab(s) orally once a day (at bedtime), As Needed    AICD/PPM: [ ] yes   [X] no    PERTINENT LAB DATA:                        12.1   5.62  )-----------( 156      ( 30 Sep 2022 04:37 )             38.2     09-30    141  |  105  |  8   ----------------------------<  112<H>  3.7   |  24  |  0.53    Ca    8.9      30 Sep 2022 04:37  Phos  3.6     09-30  Mg     1.9     09-30    TPro  6.2  /  Alb  3.9  /  TBili  0.3  /  DBili  x   /  AST  16  /  ALT  13  /  AlkPhos  77  09-30                PHYSICAL EXAMINATION:    T(C): 36.4  HR: 62  BP: 140/52  RR: 18  SpO2: 98%    Constitutional: NAD    Neck:  No JVD  Respiratory: CTAB/L  Cardiovascular: S1 and S2  Gastrointestinal: BS+, soft, NT/ND  Extremities: No peripheral edema  Neurological: A/O x 3, no focal deficits        COMMENTS:    The patient is a suitable candidate for the planned procedure unless box checked [ ]  No, explain:     Attending Physician: Kevin                      Procedure: EGD/colonoscopy    Indication for Procedure: bloody stools  ________________________________________________________  PAST MEDICAL & SURGICAL HISTORY:  Anxiety      Chronic bronchitis      Smoker      GERD (gastroesophageal reflux disease)      Hypertriglyceridemia      HLD (hyperlipidemia)      Smoker      Schizophrenia      ОЛЬГА (obstructive sleep apnea)      Tourettes syndrome      COPD (chronic obstructive pulmonary disease)      DM (diabetes mellitus)  resolved with weight loss      Cardiac pacemaker        ALLERGIES:  No Known Allergies    HOME MEDICATIONS:  aspirin 81 mg oral delayed release tablet: 1 tab(s) orally once a day  atorvastatin 80 mg oral tablet: 1 tab(s) orally once a day  busPIRone 15 mg oral tablet: 1 tab(s) orally 3 times a day  Effexor XR: 225 milligram(s) orally once a day  ezetimibe 10 mg oral tablet: 1 tab(s) orally once a day  gabapentin 100 mg oral tablet: 1 milligram(s) orally 3 times a day, As Needed  hydrOXYzine hydrochloride 25 mg oral tablet: 1 tab(s) orally 3 times a day, As Needed  isosorbide mononitrate 60 mg oral tablet, extended release: 1 tab(s) orally once a day (in the morning)  Lopressor 50 mg oral tablet: 1 tab(s) orally 2 times a day  omeprazole 40 mg oral delayed release capsule: 1 cap(s) orally once a day  oxybutynin 10 mg/24 hr oral tablet, extended release: 1 tab(s) orally once a day  Plavix 75 mg oral tablet: 1 tab(s) orally once a day  risperiDONE 0.5 mg oral tablet: 1 tab(s) orally once a day (at bedtime), As Needed    AICD/PPM: [X] yes   [] no    PERTINENT LAB DATA:                        12.1   5.62  )-----------( 156      ( 30 Sep 2022 04:37 )             38.2     09-30    141  |  105  |  8   ----------------------------<  112<H>  3.7   |  24  |  0.53    Ca    8.9      30 Sep 2022 04:37  Phos  3.6     09-30  Mg     1.9     09-30    TPro  6.2  /  Alb  3.9  /  TBili  0.3  /  DBili  x   /  AST  16  /  ALT  13  /  AlkPhos  77  09-30                PHYSICAL EXAMINATION:    T(C): 36.4  HR: 62  BP: 140/52  RR: 18  SpO2: 98%    Constitutional: NAD    Neck:  No JVD  Respiratory: CTAB/L  Cardiovascular: S1 and S2  Gastrointestinal: BS+, soft, NT/ND  Extremities: No peripheral edema  Neurological: A/O x 3, no focal deficits        COMMENTS:    The patient is a suitable candidate for the planned procedure unless box checked [ ]  No, explain:

## 2022-09-30 NOTE — PRE-OP CHECKLIST - BOWEL PREP
[FreeTextEntry1] : Long discussion again told to stop cig. Now retired home in [place] and [facility]. Told exercise BP better on amlodipine. She lost  2 lbs. Told needs more  wt loss. Last SE 12/19 non dx.. Poor exercise tolerance told exercise. Palpitations resolved LDL high, now on Crestor 5mg. She had shingles. then constipation . Pt anxious .Allergy bad. Told see allergists. Blood reviewed. CXR reviewed She needs a adenocard thallium done

## 2022-09-30 NOTE — PRE-ANESTHESIA EVALUATION ADULT - NSANTHOSAYNRD_GEN_A_CORE
No. ОЛЬГА screening performed.  STOP BANG Legend: 0-2 = LOW Risk; 3-4 = INTERMEDIATE Risk; 5-8 = HIGH Risk

## 2022-09-30 NOTE — DISCHARGE NOTE PROVIDER - HOSPITAL COURSE
62 y/o M--followed by his PCP above--family aware of admission (patient lives with his mother) but the patient did not wish for examiner to contact family at this hour--patient with a history of anxiety / depression, compensated schizophrenia maintained on Buspar, hydroxyzine, Effexor XR (patient reports he is on 225 mg daily), CAD with multiple PCI (unclear to last PCI but patient reports 2 years ago?), + PPM, COPD with patient still smoking 2 packs/day, with patient reportedly being treated by his PCP with Cipro/Flagyl but with N/V with blood streaks/coffee grounds (?).  No abdominal pain.  NO LOC or dizziness.  Patient with loose but formed BM, denies fever, chills, rigors.   NO weight loss but with limited PO due to N/V.  NO diaphoresis.   NO palliative or exacerbating factors.  NO NSAID use.    Hospital Course:   Patient with GIB upon admission, also complaining of "pus" in feces and in vomit. Patient on outpatient antibiotics for treatment of proctitis. Underwent EGD and colonoscopy on 9/30. EGD: with normal esophagus, congestive gastropathy (Biopsied) and normal ampulla, duodenal bulb and first portion of the duodenum and second portion of the duodenum.     Colonsocopy: Stool examined in colon. Internal hemorrhoids and skin tags noted on retroflexion. The ileum was normal, no blood or bleeding seen.  Patient remained hemoedynamically stable and Hgb stable. Patient to be discharged with outpatient follow-up, PCP , GI.    60 y/o M with a history of anxiety/depression, compensated schizophrenia maintained on Buspar, hydroxyzine, Effexor XR (patient reports he is on 225 mg daily), CAD with 6 PCI, most recently over 3 years ago, PPM, COPD with patient still smoking 2 packs/day, who presented for vomiting and diarrhea, with both emesis and stool containing red blood and pus (described as white "goo"). Patient had previously undergone treatment   as outpatient with antibiotics for proctitis. Underwent EGD and colonoscopy on 9/30. EGD: with normal esophagus, congestive gastropathy (Biopsied) and normal ampulla, duodenal bulb and first portion of the duodenum and second portion of the duodenum. Colonoscopy with incomplete prep - Stool examined in colon. Internal hemorrhoids and skin tags noted on retroflexion. The ileum was normal, no blood or bleeding seen.    Patient also noted to have chronic rib fractures, asymptomatic. Patient notes he has had multiple rib fractures and vertebral fractures in the past. Work up negative for multiple myeloma, patient will follow up with PCP on discharge.     Patient remained hemodynamically stable with no further episodes of emesis or hematochezia. Patient medically optimized and stable for discharge with outpatient follow-up with his PCP and GI.    60 y/o M with a history of anxiety/depression, compensated schizophrenia maintained on Buspar, hydroxyzine, Effexor XR (patient reports he is on 225 mg daily), CAD with 6 PCI, most recently over 3 years ago, PPM, COPD with patient still smoking 2 packs/day, who presented for vomiting and diarrhea, with both emesis and stool containing red blood and pus (described as white "goo"). Patient had previously undergone treatment   as outpatient with antibiotics for proctitis. Underwent EGD and colonoscopy on 9/30. EGD: with normal esophagus, congestive gastropathy (Biopsied) and normal ampulla, duodenal bulb and first portion of the duodenum and second portion of the duodenum. Colonoscopy with incomplete prep - Stool examined in colon. Internal hemorrhoids and skin tags noted on retroflexion. The ileum was normal, no blood or bleeding seen.    Patient also noted to have chronic rib fractures, asymptomatic. Patient notes he has had multiple rib fractures and vertebral fractures in the past. Work up negative for multiple myeloma, patient will follow up with PCP on discharge.     Patient remained hemodynamically stable with no further episodes of emesis or hematochezia. Patient medically optimized and stable for discharge with outpatient follow-up with his PCP, cardiologist and GI.

## 2022-09-30 NOTE — PROGRESS NOTE ADULT - PROBLEM SELECTOR PLAN 4
Hgb A1c 6.2, patient with no previous diagnosis of DM  - Low ISS today, will monitor FS and may discontinue if BG remain low

## 2022-09-30 NOTE — PROGRESS NOTE ADULT - PROBLEM SELECTOR PLAN 3
Patient notes he has had multiple rib fractures and vertebral fractures in the past  - Will work up for multiple myeloma with protein electrophoresis Patient notes he has had multiple rib fractures and vertebral fractures in the past  - Work up for multiple myeloma with protein electrophoresis negative  - Attempted to contact PCP regarding outpatient work up since fractures are chronic, however unable to contact

## 2022-09-30 NOTE — DISCHARGE NOTE PROVIDER - NSDCCPTREATMENT_GEN_ALL_CORE_FT
PRINCIPAL PROCEDURE  Procedure: UGI endoscopy  Findings and Treatment: Impression:          - Normal upper third of esophagus, middle third of esophagus and lower third                        of esophagus.                       - Congestive gastropathy. Biopsied.                       - Normal ampulla, duodenal bulb, first portion of the duodenum and second                        portion of the duodenum.  Recommendation:      - Return patient to [facility] for ongoing care.                       - Clear liquid diet today.                       - Await pathology results.        SECONDARY PROCEDURE  Procedure: Colonoscopy  Findings and Treatment: Findings:       The perianal and digital rectal examinations were normal.       A large amount of stool was found in the entire colon, precluding visualization.       The exam was otherwise normal throughout the examined colon.       The terminal ileum appeared normal.       Non-bleeding internal hemorrhoids and skin tag were found during retroflexion. The        hemorrhoids were small.                                                                                                        Impression:          - Preparation of the colon was fair.           - Stool in the entire examined colon.                       - Internal hemorrhoids and skin tags noted on retroflexion.                       - The examined portion of the ileum was normal.                       - No blood or bleeding seen                      - No specimens collected.  Recommendation:      - Return patient to [facility] for ongoing care.                       - Resume regular diet today.                       - Repeat colonoscopy as outpatient for screening purposesgiven poor prep.                       - Outpatient colorectal surgery evaluation for hemorrhoids and skin tags seen

## 2022-09-30 NOTE — PROGRESS NOTE ADULT - PROBLEM SELECTOR PLAN 2
History of multiple PCIs, PPM, no current cardiac symptoms. Outpatient cardiologist is Dr. Douglas Goldberg who does not practice at [facility], therefore house cardiology consulted  - Clearance obtained for EGD/colonoscopy  - Held asa/Plavix in setting of active GI bleed  - Will follow up additional cardiology recommendations History of multiple PCIs, PPM, no current cardiac symptoms. Outpatient cardiologist is Dr. Douglas Goldberg who does not practice at [facility], therefore house cardiology consulted  - Clearance obtained for EGD/colonoscopy  - Held asa/Plavix in setting of active GI bleed - will restart after procedure

## 2022-09-30 NOTE — PHYSICAL THERAPY INITIAL EVALUATION ADULT - PERTINENT HX OF CURRENT PROBLEM, REHAB EVAL
61 y/oM family aware of admission, patient with a history of anxiety / depression, compensated schizophrenia maintained on Buspar, hydroxyzine, Effexor XR (patient reports he is on 225 mg daily), CAD with multiple PCI (unclear to last PCI but patient reports 2 years ago?), + PPM, COPD with patient still smoking 2 packs/day, with patient reportedly being treated by his PCP with Cipro/Flagyl but with N/V with blood streaks/coffee grounds. Hosp course: 9/28 CT A/P Thickening of the stomach wall may represent gastritis versus underdistention. Colonic diverticulosis without diverticulitis. CXR 9/29 New left seventh rib fracture, clinical correlation is recommended. 9/29 XR ribs 12 rib pairs present. Redemonstrated old healed lateral left 7th and 8th rib fracture deformities. Spinal degenerative change apparent. Intact remaining imaged osseous structures. Clear visualized lungs. No pleural effusion and no pneumothorax.Left chest wall dual-lead pacemaker presen

## 2022-09-30 NOTE — DISCHARGE NOTE PROVIDER - NSDCCPCAREPLAN_GEN_ALL_CORE_FT
PRINCIPAL DISCHARGE DIAGNOSIS  Diagnosis: GI bleed  Assessment and Plan of Treatment: You were in the hospital because you had blood in your feces. You were evaluated by the Gastroenterologists (Stomach Doctors). They performed an Endoscopy and Colonoscopy to better evaluate the cause of your bleeding. You had a biopsy during your endoscopy. Please make sure to follow-up with your gastroenterologist after being discharged from the hospital for continued medical care. In addition, you can follow up your biopsy results and discuss the role of an additional outpatient colonoscopy screen.  PLease make sure to follow up with your primary care doctor for continued medical care, preferably 1-2 weeks after being discharged from the hospital.   If you experience worsening bleeding, dizziness, loss of consciousness, shortness of breath, chest pain, abdominal pain, diarrhea or nausea/vomiting please seek medical attention.

## 2022-10-01 ENCOUNTER — TRANSCRIPTION ENCOUNTER (OUTPATIENT)
Age: 62
End: 2022-10-01

## 2022-10-01 VITALS — SYSTOLIC BLOOD PRESSURE: 152 MMHG | DIASTOLIC BLOOD PRESSURE: 61 MMHG | OXYGEN SATURATION: 98 % | HEART RATE: 70 BPM

## 2022-10-01 LAB
ALBUMIN SERPL ELPH-MCNC: 4.2 G/DL — SIGNIFICANT CHANGE UP (ref 3.3–5)
ALP SERPL-CCNC: 86 U/L — SIGNIFICANT CHANGE UP (ref 40–120)
ALT FLD-CCNC: 12 U/L — SIGNIFICANT CHANGE UP (ref 10–45)
ANION GAP SERPL CALC-SCNC: 11 MMOL/L — SIGNIFICANT CHANGE UP (ref 5–17)
AST SERPL-CCNC: 19 U/L — SIGNIFICANT CHANGE UP (ref 10–40)
BILIRUB SERPL-MCNC: 0.4 MG/DL — SIGNIFICANT CHANGE UP (ref 0.2–1.2)
BUN SERPL-MCNC: 6 MG/DL — LOW (ref 7–23)
CALCIUM SERPL-MCNC: 9.4 MG/DL — SIGNIFICANT CHANGE UP (ref 8.4–10.5)
CHLORIDE SERPL-SCNC: 107 MMOL/L — SIGNIFICANT CHANGE UP (ref 96–108)
CO2 SERPL-SCNC: 24 MMOL/L — SIGNIFICANT CHANGE UP (ref 22–31)
CREAT SERPL-MCNC: 0.66 MG/DL — SIGNIFICANT CHANGE UP (ref 0.5–1.3)
CREATININE, URINE RESULT: 47 MG/DL — SIGNIFICANT CHANGE UP
EGFR: 106 ML/MIN/1.73M2 — SIGNIFICANT CHANGE UP
GLUCOSE BLDC GLUCOMTR-MCNC: 110 MG/DL — HIGH (ref 70–99)
GLUCOSE BLDC GLUCOMTR-MCNC: 123 MG/DL — HIGH (ref 70–99)
GLUCOSE SERPL-MCNC: 123 MG/DL — HIGH (ref 70–99)
HCT VFR BLD CALC: 41.6 % — SIGNIFICANT CHANGE UP (ref 39–50)
HGB BLD-MCNC: 13.2 G/DL — SIGNIFICANT CHANGE UP (ref 13–17)
MCHC RBC-ENTMCNC: 26.9 PG — LOW (ref 27–34)
MCHC RBC-ENTMCNC: 31.7 GM/DL — LOW (ref 32–36)
MCV RBC AUTO: 84.7 FL — SIGNIFICANT CHANGE UP (ref 80–100)
NRBC # BLD: 0 /100 WBCS — SIGNIFICANT CHANGE UP (ref 0–0)
PLATELET # BLD AUTO: 149 K/UL — LOW (ref 150–400)
POTASSIUM SERPL-MCNC: 4.3 MMOL/L — SIGNIFICANT CHANGE UP (ref 3.5–5.3)
POTASSIUM SERPL-SCNC: 4.3 MMOL/L — SIGNIFICANT CHANGE UP (ref 3.5–5.3)
PROT ?TM UR-MCNC: 6 MG/DL — SIGNIFICANT CHANGE UP (ref 0–12)
PROT SERPL-MCNC: 6.7 G/DL — SIGNIFICANT CHANGE UP (ref 6–8.3)
RBC # BLD: 4.91 M/UL — SIGNIFICANT CHANGE UP (ref 4.2–5.8)
RBC # FLD: 13.7 % — SIGNIFICANT CHANGE UP (ref 10.3–14.5)
SODIUM SERPL-SCNC: 142 MMOL/L — SIGNIFICANT CHANGE UP (ref 135–145)
WBC # BLD: 4.81 K/UL — SIGNIFICANT CHANGE UP (ref 3.8–10.5)
WBC # FLD AUTO: 4.81 K/UL — SIGNIFICANT CHANGE UP (ref 3.8–10.5)

## 2022-10-01 PROCEDURE — 99239 HOSP IP/OBS DSCHRG MGMT >30: CPT

## 2022-10-01 RX ADMIN — NYSTATIN CREAM 1 APPLICATION(S): 100000 CREAM TOPICAL at 06:21

## 2022-10-01 RX ADMIN — ISOSORBIDE MONONITRATE 60 MILLIGRAM(S): 60 TABLET, EXTENDED RELEASE ORAL at 11:53

## 2022-10-01 RX ADMIN — Medication 1 PATCH: at 14:24

## 2022-10-01 RX ADMIN — Medication 3 MILLILITER(S): at 06:22

## 2022-10-01 RX ADMIN — Medication 15 MILLIGRAM(S): at 06:20

## 2022-10-01 RX ADMIN — Medication 25 MILLIGRAM(S): at 13:28

## 2022-10-01 RX ADMIN — Medication 25 MILLIGRAM(S): at 06:21

## 2022-10-01 RX ADMIN — Medication 15 MILLIGRAM(S): at 13:27

## 2022-10-01 RX ADMIN — PANTOPRAZOLE SODIUM 40 MILLIGRAM(S): 20 TABLET, DELAYED RELEASE ORAL at 06:21

## 2022-10-01 RX ADMIN — Medication 3 MILLILITER(S): at 11:53

## 2022-10-01 RX ADMIN — Medication 1 PATCH: at 04:39

## 2022-10-01 RX ADMIN — Medication 1 PATCH: at 11:53

## 2022-10-01 RX ADMIN — Medication 50 MILLIGRAM(S): at 06:21

## 2022-10-01 RX ADMIN — Medication 1 PATCH: at 06:22

## 2022-10-01 NOTE — PROGRESS NOTE ADULT - REASON FOR ADMISSION
N/V with blood streaks/ coffee grounds (?)

## 2022-10-01 NOTE — PROGRESS NOTE ADULT - SUBJECTIVE AND OBJECTIVE BOX
Subjective:    INTERVAL HPI/OVERNIGHT EVENTS:   No acute events overnight  Afebrile, hemodynamically stable   - doing well no hematemesis, no hematochezia. Hb stable. Doing well, asking about going home; pending PT eval.     Telemetry:    T(F): 97.8 (10-01-22 @ 04:10), Max: 98.2 (09-30-22 @ 19:54)  HR: 63 (10-01-22 @ 04:10) (60 - 64)  BP: 124/58 (10-01-22 @ 04:10) (88/42 - 135/-)  RR: 18 (10-01-22 @ 04:10) (16 - 26)  SpO2: 97% (10-01-22 @ 04:10) (96% - 100%)  I&O's Summary    Weight (kg): 86.2 (09-30-22 @ 14:07)    Medications:  acetaminophen     Tablet .. 650 milliGRAM(s) Oral every 6 hours PRN  albuterol/ipratropium for Nebulization 3 milliLiter(s) Nebulizer every 6 hours  atorvastatin 80 milliGRAM(s) Oral at bedtime  busPIRone 15 milliGRAM(s) Oral three times a day  dextrose 5%. 1000 milliLiter(s) (50 mL/Hr) IV Continuous <Continuous>  dextrose 5%. 1000 milliLiter(s) (100 mL/Hr) IV Continuous <Continuous>  dextrose 50% Injectable 25 Gram(s) IV Push once  dextrose 50% Injectable 12.5 Gram(s) IV Push once  dextrose 50% Injectable 25 Gram(s) IV Push once  dextrose Oral Gel 15 Gram(s) Oral once PRN  glucagon  Injectable 1 milliGRAM(s) IntraMuscular once  hydrOXYzine hydrochloride 25 milliGRAM(s) Oral three times a day  influenza   Vaccine 0.5 milliLiter(s) IntraMuscular once  insulin lispro (ADMELOG) corrective regimen sliding scale   SubCutaneous three times a day before meals  insulin lispro (ADMELOG) corrective regimen sliding scale   SubCutaneous at bedtime  isosorbide   mononitrate ER Tablet (IMDUR) 60 milliGRAM(s) Oral daily  metoprolol tartrate 50 milliGRAM(s) Oral two times a day  nicotine - 21 mG/24Hr(s) Patch 1 Patch Transdermal daily  nystatin Powder 1 Application(s) Topical two times a day  pantoprazole  Injectable 40 milliGRAM(s) IV Push two times a day      PHYSICAL EXAM:  GENERAL: Non-toxic appearing, lying in bed  HEAD: Atraumatic, Normocephalic  EYES: EOMI, conjunctiva and sclera clear  NECK: Supple, no tracheal deviation  CHEST/LUNG: Clear to auscultation bilaterally; No wheezes or crackles. Healing old vesicles? seen in midsternal region  HEART: Normal S1/S2; Regular rate and rhythm; No murmurs, rubs, or gallops  ABDOMEN: Soft, Nontender, Nondistended; Bowel sounds present  EXTREMITIES: 2+ Peripheral Pulses; No clubbing, cyanosis, or edema  PSYCH: A&Ox3, flat affect  NEUROLOGY: no focal neurologic deficit  Notable Labs:    Labs:                          13.2   4.81  )-----------( 149      ( 01 Oct 2022 08:57 )             41.6     10-01    142  |  107  |  6<L>  ----------------------------<  123<H>  4.3   |  24  |  0.66    Ca    9.4      01 Oct 2022 08:57  Phos  3.6     09-30  Mg     1.9     09-30    TPro  6.7  /  Alb  4.2  /  TBili  0.4  /  DBili  x   /  AST  19  /  ALT  12  /  AlkPhos  86  10-01    LIVER FUNCTIONS - ( 01 Oct 2022 08:57 )  Alb: 4.2 g/dL / Pro: 6.7 g/dL / ALK PHOS: 86 U/L / ALT: 12 U/L / AST: 19 U/L / GGT: x             CAPILLARY BLOOD GLUCOSE      POCT Blood Glucose.: 123 mg/dL (01 Oct 2022 08:33)  POCT Blood Glucose.: 93 mg/dL (30 Sep 2022 22:27)  POCT Blood Glucose.: 115 mg/dL (30 Sep 2022 17:40)  POCT Blood Glucose.: 106 mg/dL (30 Sep 2022 12:04)                Micro:    Culture - Urine (collected 09-28-22 @ 20:48)  Source: Clean Catch Clean Catch (Midstream)  Final Report (09-30-22 @ 21:49):    Culture grew 3 or more types of organisms which indicate    collection contamination; consider recollection only if clinically    indicated.        RADIOLOGY & ADDITIONAL TESTS:    NNI

## 2022-10-01 NOTE — PROGRESS NOTE ADULT - ATTENDING COMMENTS
Normal for race
Pt seen and examined. No acute events overnight.  s/p  EGD/Colonoscopy on 9/30 ; showed congestive gastropathy.  Hb stable with no overt GI bleeding, no further work up from GI perspective.   Pt is clinically stable for discharge to f/up in GI clinic in 1-2 weeks.
S/P  EGD/COlonoscopy on 9/30 f/up report, cardio rec is appreciated , cont CBC every 8 hours , type and screen, transfuse if hem <8 ( CAD ) , or is actively bleeding with hemodynamic instability.  Currently stable cont to monitor.  F/UP PT and GI recs prior to DC      Bethany Shah   Hospitalist   [phone]
Plan for EGD/COlonoscopy in AM , cardio rec is appreciated , cont CBC every 8 hours , type and screen, transfuse if hem <8 ( CAD ) , or is actively bleeding with hemodynamic instability.        Bethany Shah   Hospitalist   [phone]

## 2022-10-01 NOTE — PROGRESS NOTE ADULT - PROBLEM SELECTOR PLAN 1
Unclear origin, likely upper GI bleed brisk enough to remain as red streaks in stool. Patient notes pus alongside blood ?infectious trigger vs. inflammation  - GI on board - EGD/colonoscopy done 9/30, pending GI recs post-procedure  - Stool studies sent - PCR, cultures, C. diff toxin, calprotectin - pending

## 2022-10-01 NOTE — DISCHARGE NOTE NURSING/CASE MANAGEMENT/SOCIAL WORK - PATIENT PORTAL LINK FT
You can access the FollowMyHealth Patient Portal offered by [facility] by registering at the following website: [email or web address]. By joining [facility]’s FollowMyHealth portal, you will also be able to view your health information using other applications (apps) compatible with our system.

## 2022-10-01 NOTE — PROGRESS NOTE ADULT - PROBLEM SELECTOR PLAN 2
History of multiple PCIs, PPM, no current cardiac symptoms. Outpatient cardiologist is Dr. Douglas Goldberg who does not practice at [facility], therefore house cardiology consulted  - Clearance obtained for EGD/colonoscopy  - Held asa/Plavix in setting of active GI bleed - will restart after procedure

## 2022-10-01 NOTE — DISCHARGE NOTE NURSING/CASE MANAGEMENT/SOCIAL WORK - NSDCVIVACCINE_GEN_ALL_CORE_FT
influenza, injectable, quadrivalent, preservative free; 18-Apr-2018 12:17; Vivian Escobar (RN); [employer]; 572KT; IntraMuscular; Deltoid Left.; 0.5 milliLiter(s); VIS (VIS Published: 07-Aug-2015, VIS Presented: 18-Apr-2018);

## 2022-10-01 NOTE — PROGRESS NOTE ADULT - PROBLEM SELECTOR PLAN 3
Patient notes he has had multiple rib fractures and vertebral fractures in the past  - Work up for multiple myeloma with protein electrophoresis negative  - Attempted to contact PCP regarding outpatient work up since fractures are chronic, however unable to contact

## 2022-10-01 NOTE — DISCHARGE NOTE NURSING/CASE MANAGEMENT/SOCIAL WORK - NSDCFUADDAPPT_GEN_ALL_CORE_FT
Please make sure to follow up with your Gastroenterologist 1-2 weeks after being discharged from the hospital. You will need to discuss the role of an outpatient colonoscopy. You can use any of the following clinics:  [phone] ([facility] at [address] [place] [address])   [phone] ([facility] at [address] [place] [address])   [phone] ([facility] at [address] [place] [address]).     Please make sure to follow-up with your primary care doctor 1-2 weeks after being discharged from the hospital for continued medical care.

## 2022-10-01 NOTE — PROVIDER CONTACT NOTE (OTHER) - DATE AND TIME:
[place]   Neg.          Ethylenediamine Dihydrochloride  Neg.          Cobalt 01-Oct-2022 08:15

## 2022-10-01 NOTE — CHART NOTE - NSCHARTNOTEFT_GEN_A_CORE
Attempted to call pt multiple times pt's VM was full, I called [employer] who stated they have not received any PAP Orders for pt. I faxed over PAP order along with PSG/Titrations office notes and interactive face sheet I did not fax over insurance card there is a note stating pt's [place] has termed. Donna Manrique GI Brief Note:    Patient s/p EGD/Colonoscopy with results and recs as noted in report.   Hb stable with no overt GI bleeding, no further work up from our perspective as OP.     Please provide patient with Gastroenterology Clinic number to confirm/arrange appointment; [phone] ([facility] at [address] [facility]) or [phone] ([facility] at [address] [place] [address]) or [phone] ([facility] at [address] [facility]).     Please reach out to us for any question or concern.      Donn Allen MD  Gastroenterology/Hepatology Fellow  1st option: [phone] (text or call), ONLY available from 7:00 am to 5:00 pm.   **Contact on-call GI fellow via answering service ([phone]) from 5pm-7am AND on weekends/holidays**  2nd option: Available via [employer] Teams  3rd option: Pager: [phone]

## 2022-10-01 NOTE — PROGRESS NOTE ADULT - TIME BILLING
case complexity and discharge planning. Pt is clinically stable for discharge. He is to f/up in GI clinic in 1-2 weeks.

## 2022-10-01 NOTE — DISCHARGE NOTE NURSING/CASE MANAGEMENT/SOCIAL WORK - NSDCPEFALRISK_GEN_ALL_CORE
For information on Fall & Injury Prevention, visit: [email or web address] OR  [email or web address] OR  https://www.cdc.gov/steadi/patient.html

## 2022-10-01 NOTE — PROGRESS NOTE ADULT - ASSESSMENT
Mr. Leventhal is a 62 YO man with a few week history of intermittent nausea and vomiting with pus and blood in emesis/diarrea, who presents for worsening symptoms.    Weekedn 10/1: Should be good for DC, ok frm GI standpoint. No bleeding overnight. Pending PT eval, and needs ride home for DC.

## 2022-10-01 NOTE — PROGRESS NOTE ADULT - PROBLEM SELECTOR PLAN 6
Diet: Clears, NPO after midnight  DVT prophylaxis: No chemical prophylaxis as having active bleed  Dispo: pending course/PT recs

## 2022-10-03 ENCOUNTER — NON-APPOINTMENT (OUTPATIENT)
Age: 62
End: 2022-10-03

## 2022-10-04 LAB — SURGICAL PATHOLOGY STUDY: SIGNIFICANT CHANGE UP

## 2022-10-05 LAB
% GAMMA, URINE: 29.4 % — SIGNIFICANT CHANGE UP
ALBUMIN 24H MFR UR ELPH: 16.6 % — SIGNIFICANT CHANGE UP
ALPHA1 GLOB 24H MFR UR ELPH: 26.2 % — SIGNIFICANT CHANGE UP
ALPHA2 GLOB 24H MFR UR ELPH: 17.8 % — SIGNIFICANT CHANGE UP
B-GLOBULIN 24H MFR UR ELPH: 10 % — SIGNIFICANT CHANGE UP
COLLECT DURATION TIME UR: 24 HR — SIGNIFICANT CHANGE UP
INTERPRETATION 24H UR IFE-IMP: SIGNIFICANT CHANGE UP
INTERPRETATION 24H UR IFE-IMP: SIGNIFICANT CHANGE UP
M PROTEIN 24H UR ELPH-MRATE: 0 MG/24HR — SIGNIFICANT CHANGE UP (ref 0–0)
M PROTEIN 24H UR ELPH-MRATE: 0 MG/DL — SIGNIFICANT CHANGE UP
PROT ?TM UR-MCNC: 6 MG/DL — SIGNIFICANT CHANGE UP (ref 0–12)
PROT PATTERN 24H UR ELPH-IMP: SIGNIFICANT CHANGE UP
PROTEIN QUANT CALC, URINE: 69 MG/24 H — SIGNIFICANT CHANGE UP (ref 50–100)
TOTAL VOLUME - 24 HOUR: 1150 ML — SIGNIFICANT CHANGE UP
URINE CREATININE CALCULATION: 0.5 G/24 H — LOW (ref 1–2)

## 2022-10-06 LAB
% ALBUMIN: 61.4 % — SIGNIFICANT CHANGE UP
% ALPHA 1: 4.7 % — SIGNIFICANT CHANGE UP
% ALPHA 2: 13.6 % — SIGNIFICANT CHANGE UP
% BETA: 11.4 % — SIGNIFICANT CHANGE UP
% GAMMA: 8.9 % — SIGNIFICANT CHANGE UP
ALBUMIN SERPL ELPH-MCNC: 3.4 G/DL — LOW (ref 3.6–5.5)
ALBUMIN/GLOB SERPL ELPH: 1.5 RATIO — SIGNIFICANT CHANGE UP
ALPHA1 GLOB SERPL ELPH-MCNC: 0.3 G/DL — SIGNIFICANT CHANGE UP (ref 0.1–0.4)
ALPHA2 GLOB SERPL ELPH-MCNC: 0.8 G/DL — SIGNIFICANT CHANGE UP (ref 0.5–1)
B-GLOBULIN SERPL ELPH-MCNC: 0.6 G/DL — SIGNIFICANT CHANGE UP (ref 0.5–1)
GAMMA GLOBULIN: 0.5 G/DL — LOW (ref 0.6–1.6)
INTERPRETATION SERPL IFE-IMP: SIGNIFICANT CHANGE UP
PROT PATTERN SERPL ELPH-IMP: SIGNIFICANT CHANGE UP

## 2022-10-11 DIAGNOSIS — M62.838 OTHER MUSCLE SPASM: ICD-10-CM

## 2022-10-11 RX ORDER — METHOCARBAMOL 500 MG/1
500 TABLET, FILM COATED ORAL
Qty: 21 | Refills: 0 | Status: ACTIVE | COMMUNITY
Start: 2022-10-11 | End: 1900-01-01

## 2022-10-18 ENCOUNTER — NON-APPOINTMENT (OUTPATIENT)
Age: 62
End: 2022-10-18

## 2022-10-20 PROCEDURE — 84132 ASSAY OF SERUM POTASSIUM: CPT

## 2022-10-20 PROCEDURE — G1004: CPT

## 2022-10-20 PROCEDURE — 71045 X-RAY EXAM CHEST 1 VIEW: CPT

## 2022-10-20 PROCEDURE — 82435 ASSAY OF BLOOD CHLORIDE: CPT

## 2022-10-20 PROCEDURE — 87086 URINE CULTURE/COLONY COUNT: CPT

## 2022-10-20 PROCEDURE — 82962 GLUCOSE BLOOD TEST: CPT

## 2022-10-20 PROCEDURE — 85025 COMPLETE CBC W/AUTO DIFF WBC: CPT

## 2022-10-20 PROCEDURE — 85027 COMPLETE CBC AUTOMATED: CPT

## 2022-10-20 PROCEDURE — 86850 RBC ANTIBODY SCREEN: CPT

## 2022-10-20 PROCEDURE — 83036 HEMOGLOBIN GLYCOSYLATED A1C: CPT

## 2022-10-20 PROCEDURE — 83735 ASSAY OF MAGNESIUM: CPT

## 2022-10-20 PROCEDURE — 84100 ASSAY OF PHOSPHORUS: CPT

## 2022-10-20 PROCEDURE — 82652 VIT D 1 25-DIHYDROXY: CPT

## 2022-10-20 PROCEDURE — 0225U NFCT DS DNA&RNA 21 SARSCOV2: CPT

## 2022-10-20 PROCEDURE — 97161 PT EVAL LOW COMPLEX 20 MIN: CPT

## 2022-10-20 PROCEDURE — 99285 EMERGENCY DEPT VISIT HI MDM: CPT

## 2022-10-20 PROCEDURE — 74177 CT ABD & PELVIS W/CONTRAST: CPT | Mod: MG

## 2022-10-20 PROCEDURE — 82947 ASSAY GLUCOSE BLOOD QUANT: CPT

## 2022-10-20 PROCEDURE — 71110 X-RAY EXAM RIBS BIL 3 VIEWS: CPT

## 2022-10-20 PROCEDURE — 82330 ASSAY OF CALCIUM: CPT

## 2022-10-20 PROCEDURE — 83605 ASSAY OF LACTIC ACID: CPT

## 2022-10-20 PROCEDURE — 84156 ASSAY OF PROTEIN URINE: CPT

## 2022-10-20 PROCEDURE — 86803 HEPATITIS C AB TEST: CPT

## 2022-10-20 PROCEDURE — 84166 PROTEIN E-PHORESIS/URINE/CSF: CPT

## 2022-10-20 PROCEDURE — 83690 ASSAY OF LIPASE: CPT

## 2022-10-20 PROCEDURE — 88305 TISSUE EXAM BY PATHOLOGIST: CPT

## 2022-10-20 PROCEDURE — 84165 PROTEIN E-PHORESIS SERUM: CPT

## 2022-10-20 PROCEDURE — 82570 ASSAY OF URINE CREATININE: CPT

## 2022-10-20 PROCEDURE — 96375 TX/PRO/DX INJ NEW DRUG ADDON: CPT

## 2022-10-20 PROCEDURE — 86901 BLOOD TYPING SEROLOGIC RH(D): CPT

## 2022-10-20 PROCEDURE — 85018 HEMOGLOBIN: CPT

## 2022-10-20 PROCEDURE — 84295 ASSAY OF SERUM SODIUM: CPT

## 2022-10-20 PROCEDURE — 81001 URINALYSIS AUTO W/SCOPE: CPT

## 2022-10-20 PROCEDURE — 96374 THER/PROPH/DIAG INJ IV PUSH: CPT

## 2022-10-20 PROCEDURE — 94640 AIRWAY INHALATION TREATMENT: CPT

## 2022-10-20 PROCEDURE — 80053 COMPREHEN METABOLIC PANEL: CPT

## 2022-10-20 PROCEDURE — 85014 HEMATOCRIT: CPT

## 2022-10-20 PROCEDURE — 36415 COLL VENOUS BLD VENIPUNCTURE: CPT

## 2022-10-20 PROCEDURE — 86900 BLOOD TYPING SEROLOGIC ABO: CPT

## 2022-10-20 PROCEDURE — 82803 BLOOD GASES ANY COMBINATION: CPT

## 2022-10-21 ENCOUNTER — NON-APPOINTMENT (OUTPATIENT)
Age: 62
End: 2022-10-21

## 2022-10-21 DIAGNOSIS — H66.90 OTITIS MEDIA, UNSPECIFIED, UNSPECIFIED EAR: ICD-10-CM

## 2022-10-26 ENCOUNTER — NON-APPOINTMENT (OUTPATIENT)
Age: 62
End: 2022-10-26

## 2022-11-06 RX ORDER — VANCOMYCIN HYDROCHLORIDE 125 MG/1
125 CAPSULE ORAL
Qty: 120 | Refills: 1 | Status: ACTIVE | COMMUNITY
Start: 2022-11-06 | End: 1900-01-01

## 2022-11-09 RX ORDER — OMEPRAZOLE 20 MG/1
20 CAPSULE, DELAYED RELEASE ORAL
Qty: 180 | Refills: 0 | Status: ACTIVE | COMMUNITY
Start: 2020-03-04 | End: 1900-01-01

## 2022-11-09 RX ORDER — CLOPIDOGREL BISULFATE 75 MG/1
75 TABLET, FILM COATED ORAL
Qty: 90 | Refills: 0 | Status: ACTIVE | COMMUNITY
Start: 2019-10-30 | End: 1900-01-01

## 2023-01-11 DIAGNOSIS — Z11.1 ENCOUNTER FOR SCREENING FOR RESPIRATORY TUBERCULOSIS: ICD-10-CM

## 2023-01-12 DIAGNOSIS — J42 UNSPECIFIED CHRONIC BRONCHITIS: ICD-10-CM

## 2023-01-19 ENCOUNTER — APPOINTMENT (OUTPATIENT)
Dept: INTERNAL MEDICINE | Facility: CLINIC | Age: 63
End: 2023-01-19

## 2023-02-01 ENCOUNTER — APPOINTMENT (OUTPATIENT)
Dept: SURGERY | Facility: CLINIC | Age: 63
End: 2023-02-01
Payer: MEDICARE

## 2023-02-01 VITALS
DIASTOLIC BLOOD PRESSURE: 79 MMHG | BODY MASS INDEX: 32.49 KG/M2 | HEART RATE: 63 BPM | WEIGHT: 207 LBS | SYSTOLIC BLOOD PRESSURE: 174 MMHG | HEIGHT: 67 IN | OXYGEN SATURATION: 95 % | TEMPERATURE: 97.8 F

## 2023-02-01 PROCEDURE — 99203 OFFICE O/P NEW LOW 30 MIN: CPT | Mod: 25

## 2023-02-01 PROCEDURE — 45300 PROCTOSIGMOIDOSCOPY DX: CPT

## 2023-02-13 DIAGNOSIS — K62.89 OTHER SPECIFIED DISEASES OF ANUS AND RECTUM: ICD-10-CM

## 2023-02-16 ENCOUNTER — APPOINTMENT (OUTPATIENT)
Dept: INTERNAL MEDICINE | Facility: CLINIC | Age: 63
End: 2023-02-16

## 2023-02-22 RX ORDER — CLINDAMYCIN HYDROCHLORIDE 300 MG/1
300 CAPSULE ORAL 3 TIMES DAILY
Qty: 60 | Refills: 0 | Status: ACTIVE | COMMUNITY
Start: 2023-02-13 | End: 1900-01-01

## 2023-02-22 RX ORDER — AMOXICILLIN 875 MG/1
875 TABLET, FILM COATED ORAL
Qty: 14 | Refills: 0 | Status: DISCONTINUED | COMMUNITY
Start: 2022-10-21 | End: 2023-02-22

## 2023-02-22 RX ORDER — CIPROFLOXACIN HYDROCHLORIDE 500 MG/1
500 TABLET, FILM COATED ORAL
Qty: 20 | Refills: 0 | Status: DISCONTINUED | COMMUNITY
Start: 2022-01-10 | End: 2023-02-22

## 2023-03-14 RX ORDER — TAMSULOSIN HYDROCHLORIDE 0.4 MG/1
0.4 CAPSULE ORAL
Qty: 90 | Refills: 3 | Status: ACTIVE | COMMUNITY
Start: 2019-06-19 | End: 1900-01-01

## 2023-08-24 NOTE — PATIENT PROFILE ADULT - FUNCTIONAL ASSESSMENT - DAILY ACTIVITY 5.
ER Provider Note    Scribed for Brian Vega M.D. by Eileen Santoyo. 8/23/2023   7:33 PM    Primary Care Provider: Charlee Walker M.D.    CHIEF COMPLAINT  None noted    EXTERNAL RECORDS REVIEWED  Inpatient Notes: The patient was discharged from the hospital today after surgery on 8/16/2023 by Dr. Lawrence. She had CABG x1 and aortic valve replacement.     HPI/ROS  LIMITATION TO HISTORY   Select: : None  OUTSIDE HISTORIAN(S):  EMS    Margoth Hopkins is a 73 y.o. female who presents to the ED for evaluation of hypotension noticed by EMS on scene. Per EMS, the patient had open heart surgery and was discharged home today. EMS states the patient was doing well but then found by her  one hour ago altered, incontinent and complaining of abdominal pain. EMS reports her blood pressure was 55/30 and heart rate 115. EMS reports mild improvement of her blood pressure with approximately 700 mL of fluids. EMS notes the patient has A-Fib with RVR.     PAST MEDICAL HISTORY  Past Medical History:   Diagnosis Date    Adverse effect of anesthesia 15 yrs ago ?    Felt pain from endoscopy    Anemia     H/O    Anesthesia     No    Anginal syndrome (HCC)     Aortic insufficiency 01/31/2020    Pt. states this is mild and does not have any signs or symptoms.    Arthritis 06/16/2023    general body    Breath shortness 11/02/2021    with exertion or anxiety    Bronchitis 60+ yrs ago    Cataract 06/16/2023    IOL OU 2018    Dental disorder 06/16/2023    upper/lower front 2 teeth crowned    Gastric ulcer     Goiter     Heart burn 2022    Occasionally after acidic meals.    Heart murmur 1965    High cholesterol 06/16/2023    medicated    Hypertension 06/16/2023    well controlled on meds    Hypothyroidism 06/16/2023    medicated    Pain 06/16/2023    bilat knees, back    PONV (postoperative nausea and vomiting) 6/21/22    Psychiatric problem 06/16/2023    anxiety, medicated    Pulmonary embolism (HCC) 01/31/2020    Pt. states after joint  replacement in 7-2018.    Reactive arthritis (HCC)     Snoring 06/16/2023    Thyroid disease     hasimotos    Thyroid disease 01/31/2020    Thyroid Nodules    Urinary bladder disorder 15 yrs ago    Sling       SURGICAL HISTORY  Past Surgical History:   Procedure Laterality Date    MULTIPLE CORONARY ARTERY BYPASS ENDO VEIN HARVEST N/A 8/16/2023    Procedure: CORONARY ARTERY BYPASS GRAFT X1;  Surgeon: Tammy Lawrence M.D.;  Location: SURGERY [facility];  Service: Cardiothoracic    AORTIC VALVE REPLACEMENT N/A 8/16/2023    Procedure: REPLACEMENT, AORTIC VALVE;  Surgeon: Tammy Lawrence M.D.;  Location: SURGERY [facility];  Service: Cardiothoracic    ECHOCARDIOGRAM, TRANSESOPHAGEAL, INTRAOPERATIVE N/A 8/16/2023    Procedure: ECHOCARDIOGRAM, TRANSESOPHAGEAL, INTRAOPERATIVE;  Surgeon: Tammy Lawrence M.D.;  Location: SURGERY [facility];  Service: Cardiothoracic    FUSION  06/16/2023    neck, 2013    OTHER CARDIAC SURGERY  06/16/2023    heart cath 2023    KNEE ARTHROPLASTY TOTAL Left 06/2022    MASS EXCISION GENERAL Right 11/05/2021    Procedure: EXCISION, MASS - LATERAL THIGH;  Surgeon: Cydney Franklin M.D.;  Location: SURGERY SAME DAY [facility];  Service: General    THYROIDECTOMY TOTAL Bilateral 02/05/2020    Procedure: THYROIDECTOMY, TOTAL- COMPLETE;  Surgeon: Cydney Franklin M.D.;  Location: SURGERY SAME DAY [facility];  Service: General    APPENDECTOMY      BLADDER SLING FEMALE      CATARACT EXTRACTION WITH IOL Left     CATARACT EXTRACTION WITH IOL Bilateral     CERVICAL FUSION POSTERIOR      FOOT SURGERY Left     metatarsal fusion, hammer toes correction    GYN SURGERY      Hysterectomy    KNEE ARTHROPLASTY TOTAL Right     x3    KNEE REPLACEMENT, TOTAL Right     KNEE REVISION TOTAL Right     NO PERTINENT PAST SURGICAL HISTORY  2017    Left foot surgery    OTHER NEUROLOGICAL SURG  2016    Cervical spine       FAMILY HISTORY  Family History   Problem Relation Age of Onset    Arthritis Mother     Alzheimer's  Disease Mother     Cancer Mother         Cervical Cancer    Heart Attack Father     Cancer Father         Hypertrophy of the heart, CAD    Other Brother         brain anurism    Hypertension Brother     Cancer Maternal Grandmother 66        colono cancer    Colorectal Cancer Maternal Grandmother         Colon Cancer    Heart Disease Brother         Quadruple bypass    Hypertension Brother     Arthritis Brother     Other Brother         Celiac Disease    No Known Problems Maternal Grandfather     No Known Problems Paternal Grandmother     No Known Problems Paternal Grandfather     Hypertension Brother         Spinal stenosis    Arthritis Brother     Lung Disease Brother         Asthma, emphysema    Asthma Brother        SOCIAL HISTORY   reports that she has never smoked. She has never used smokeless tobacco. She reports current alcohol use of about 4.2 - 8.4 oz of alcohol per week. She reports that she does not currently use drugs after having used the following drugs: Oral.    CURRENT MEDICATIONS  Previous Medications    ACETAMINOPHEN (TYLENOL) 500 MG TAB    Take 2 Tablets by mouth every 6 hours as needed for Moderate Pain.    AMIODARONE (PACERONE) 400 MG TABLET    Take 1 Tablet by mouth 2 times a day.    APIXABAN (ELIQUIS) 5MG TAB    Take 1 Tablet by mouth 2 times a day. Indications: Thromboembolism secondary to Atrial Fibrillation    ASPIRIN 81 MG EC TABLET    Take 1 Tablet by mouth every day.    BUSPIRONE (BUSPAR) 10 MG TAB TABLET    Take 10 mg by mouth every evening. Pt takes once a day, not BID    DIAZEPAM (VALIUM) 2 MG TAB    Take 2 mg by mouth every 6 hours as needed for Anxiety.    DICLOFENAC SODIUM (VOLTAREN) 1 % GEL    Apply 2 g topically 4 times a day as needed (Apply's on both knees).    DULOXETINE HCL 40 MG CAP DR PARTICLES    Take 40 mg by mouth 2 times a day.    FUROSEMIDE (LASIX) 20 MG TAB    Take 1 Tablet by mouth every day.    LISINOPRIL (PRINIVIL) 5 MG TAB    Take 1 Tablet by mouth every day.     "METOPROLOL TARTRATE (LOPRESSOR) 25 MG TAB    Take 1 Tablet by mouth 2 times a day.    OMEPRAZOLE (PRILOSEC) 20 MG DELAYED-RELEASE CAPSULE    Take 1 Capsule by mouth every day.    POTASSIUM CHLORIDE (MICRO-K) 10 MEQ CAPSULE    Take 1 Capsule by mouth every day.    PREGABALIN (LYRICA) 50 MG CAPSULE    Take 50 mg by mouth 2 times a day.    PROPRANOLOL (INDERAL) 40 MG TAB    Take 1 Tablet by mouth 3 times a day.    ROSUVASTATIN (CRESTOR) 40 MG TABLET    Take 1 Tablet by mouth every day.    SYNTHROID 75 MCG TAB    Take 1 Tablet by mouth every morning on an empty stomach.    TRAMADOL (ULTRAM) 50 MG TAB    Take 1 Tablet by mouth every 6 hours as needed for Severe Pain for up to 14 days.    VITAMIN D2, ERGOCALCIFEROL, (DRISDOL) 1.25 MG (57198 UT) CAP CAPSULE    Take 1 Capsule by mouth every 7 days. On Sat    ZOLPIDEM (AMBIEN) 5 MG TAB    Take 1 Tablet by mouth at bedtime as needed for Sleep for up to 60 days.       ALLERGIES  Allergies   Allergen Reactions    Morphine Vomiting    Nsaids Unspecified     History of gastric ulcers - cannot take NSAIDS    Pcn [Penicillins] Hives     Tolerates ancef    Seasonal Runny Nose and Itching     .        PHYSICAL EXAM  BP 93/55   Pulse (!) 114   Temp 37.1 °C (98.7 °F) (Temporal)   Resp 20   Ht 1.702 m (5' 7\")   Wt 77.1 kg (170 lb)   SpO2 90%   BMI 26.63 kg/m²      Nursing note and vitals reviewed.  Constitutional: Well-developed and well-nourished. No distress. Ill appearing female, fatigued, cool to touch  HENT: Head is normocephalic and atraumatic. Oropharynx is clear and moist without exudate or erythema.   Eyes: Pupils are equal, round, and reactive to light. Conjunctiva are normal.   Cardiovascular: Tachycardia with Irregularly irregular rhythm. No murmur heard. Normal radial pulses.  Pulmonary/Chest: Breath sounds normal. No wheezes or rales. Midline sternotomy wound noted. Healing well, no evidence of infection.  Abdominal: Soft. No distention. She has mild tenerness over " the mid abdomen without rebound or guarding, Scattered brusing over the abdominal wall  Musculoskeletal: Extremities exhibit normal range of motion without edema or tenderness.   Neurological: Awake, alert and oriented to person, place, and time. No focal deficits noted.  Skin: Skin is warm and dry. No rash.   Psychiatric: Normal mood and affect. Appropriate for clinical situation    DIAGNOSTIC STUDIES    Labs:   Results for orders placed or performed during the hospital encounter of 08/23/23   LACTIC ACID   Result Value Ref Range    Lactic Acid 9.7 (HH) 0.5 - 2.0 mmol/L   LACTIC ACID   Result Value Ref Range    Lactic Acid 2.6 (H) 0.5 - 2.0 mmol/L   CBC WITH DIFFERENTIAL   Result Value Ref Range    WBC 12.9 (H) 4.8 - 10.8 K/uL    RBC 3.47 (L) 4.20 - 5.40 M/uL    Hemoglobin 10.3 (L) 12.0 - 16.0 g/dL    Hematocrit 31.9 (L) 37.0 - 47.0 %    MCV 91.9 81.4 - 97.8 fL    MCH 29.7 27.0 - 33.0 pg    MCHC 32.3 32.2 - 35.5 g/dL    RDW 48.7 35.9 - 50.0 fL    Platelet Count 377 164 - 446 K/uL    MPV 9.9 9.0 - 12.9 fL    Neutrophils-Polys 68.10 44.00 - 72.00 %    Lymphocytes 23.30 22.00 - 41.00 %    Monocytes 4.30 0.00 - 13.40 %    Eosinophils 0.80 0.00 - 6.90 %    Basophils 0.00 0.00 - 1.80 %    Nucleated RBC 0.50 (H) 0.00 - 0.20 /100 WBC    Neutrophils (Absolute) 8.78 (H) 1.82 - 7.42 K/uL    Lymphs (Absolute) 3.01 1.00 - 4.80 K/uL    Monos (Absolute) 0.55 0.00 - 0.85 K/uL    Eos (Absolute) 0.10 0.00 - 0.51 K/uL    Baso (Absolute) 0.00 0.00 - 0.12 K/uL    NRBC (Absolute) 0.06 K/uL    Anisocytosis 1+     Microcytosis 1+    COMP METABOLIC PANEL   Result Value Ref Range    Sodium 139 135 - 145 mmol/L    Potassium 3.2 (L) 3.6 - 5.5 mmol/L    Chloride 103 96 - 112 mmol/L    Co2 16 (L) 20 - 33 mmol/L    Anion Gap 20.0 (H) 7.0 - 16.0    Glucose 191 (H) 65 - 99 mg/dL    Bun 21 8 - 22 mg/dL    Creatinine 1.30 0.50 - 1.40 mg/dL    Calcium 7.8 (L) 8.5 - 10.5 mg/dL    Correct Calcium 8.8 8.5 - 10.5 mg/dL    AST(SGOT) 67 (H) 12 - 45 U/L     ALT(SGPT) 97 (H) 2 - 50 U/L    Alkaline Phosphatase 352 (H) 30 - 99 U/L    Total Bilirubin 0.8 0.1 - 1.5 mg/dL    Albumin 2.8 (L) 3.2 - 4.9 g/dL    Total Protein 5.2 (L) 6.0 - 8.2 g/dL    Globulin 2.4 1.9 - 3.5 g/dL    A-G Ratio 1.2 g/dL   ESTIMATED GFR   Result Value Ref Range    GFR (CKD-EPI) 43 (A) >60 mL/min/1.73 m 2   DIFFERENTIAL MANUAL   Result Value Ref Range    Metamyelocytes 0.90 %    Myelocytes 1.70 %    Progranulocytes 0.90 %    Manual Diff Status PERFORMED    PERIPHERAL SMEAR REVIEW   Result Value Ref Range    Peripheral Smear Review see below    PLATELET ESTIMATE   Result Value Ref Range    Plt Estimation Normal    MORPHOLOGY   Result Value Ref Range    RBC Morphology Present     Poikilocytosis 1+     Echinocytes 1+    LIPASE   Result Value Ref Range    Lipase 295 (H) 11 - 82 U/L   EKG (NOW)   Result Value Ref Range    Report       [facility] Emergency Dept.    Test Date:  2023  Pt Name:    IAN JOHNSON                   Department: ER  MRN:        [id number]                      Room:       TRAUMA - EXAM 1  Gender:     Female                       Technician: [id number]  :        [id number]                   Requested By:ANUM DEJESUS  Order #:    [id number]                    Reading MD:    Measurements  Intervals                                Axis  Rate:       88                           P:          0  WI:         0                            QRS:        -29  QRSD:       112                          T:          50  QT:         403  QTc:        488    Interpretive Statements  Atrial flutter with predominant 3:1 AV block  Abnormal R-wave progression, late transition  LVH with secondary repolarization abnormality  Borderline ST elevation, inferior leads  Borderline prolonged QT interval  Baseline wander in lead(s) II,III,aVF,V1,V2,V3,V4,V5  Compared to ECG 2023 10:29:12  AV block, adva nced (high-grade) now present  ST (T wave) deviation now present  Atrial fibrillation no  longer present       EKG:   I have independently interpreted this EKG as detailed above.    Radiology:   This attending emergency physician has independently interpreted the diagnostic imaging associated with this visit and is awaiting the final reading from the radiologist.   Preliminary interpretation is a follows: cholecystitis    Radiologist interpretation:   US-RUQ   Final Result         1.  Cholelithiasis and gallbladder sludge with gallbladder wall thickening, sonographically compatible with cholecystitis.   2.  Common bile duct dilatation, consider causes of biliary obstruction with additional workup as clinically appropriate.   3.  Right pleural effusion      DX-CHEST-PORTABLE (1 VIEW)   Final Result         1.  Hazy left pulmonary opacities suggests subtle infiltrate, overall decreased since prior study.   2.  Trace left pleural effusion, decreased since prior study.   3.  Cardiomegaly      CT-CHEST,ABDOMEN,PELVIS WITH   Final Result      1.  Postoperative changes status post recent open heart surgery. Patient is status post CABG and there is an aortic valve replacement.   2.  Small pericardial and bilateral pleural effusions.   3.  Cholelithiasis with distended gallbladder and wall thickening raises concern for acute cholecystitis.   4.  Uroepithelial thickening in the left renal pelvis is nonspecific but could be seen with infection. Correlate with urinalysis.         Point of Care Ultrasound    ED POINT OF CARE ULTRASOUND: LIMITED CARDIAC    Indication for exam: Hypotension, recent cardiac surgery  LVEF: Hyperdynamic  Pericardial Effusion:not present  RV Strain:not present  Pulmonary B Lines:not present      This study is a limited ultrasound examination performed and interpreted to evaluate for limited conditions as outlined above. There may be other clinically important information contained in the images that is outside this scope. When clinically warranted, a comprehensive ultrasound through the  appropriate department is considered.      INITIAL ASSESSMENT AND PLAN    On initial evaluation the patient has a blood pressure of 50s.  She had an intraosseous line placed in the field by EMS.  IV was placed in the emergency department.  Patient was treated with IV fluids via pressure bag.  Had good improvement of blood pressure from systolic in the 50s to the 90s fairly quickly with rapid IV infusion.    7:33 PM - Patient was evaluated at bedside. Ordered for Ct-Chest, Abdomen, Pelvis with, Dx-chest, Lactic acid, CBC with differential, CMP, UA, UA Culture, Blood Culture and EKG to evaluate. Patient verbalizes understanding and support with my plan of care.  Differential diagnoses include but not limited to: Pericardial effusion, sepsis, postoperative hemorrhage, dehydration, electrolyte abnormality, arrhythmia    ED Observation Status? no.      COURSE AND MEDICAL DECISION MAKING    8:18 PM - Blood pressure is now normalized, heart rate is normalized, CT shows possible cholecystitis. Lactic Acid is 9.7.    9:44 PM - Patient will be treated with Rocephin 2,000 mg, Dilaudid 0.5 mg, Zofran 4 mg and Flagyl 500 mg.    10:04 PM - Patient's LFTs are elevated, ultrasound is pending. Ct findings and elevated white count will treat for cholecystitis.   gave pain meds and zofran.    11:11 PM I discussed the patient's case and the above findings with Dr. Manzanares (surgery) who recommends a cholecystomy tube.     11:15 PM - I discussed the patient's case and the above findings with Dr. Galvez (hospitalist) who will consult.    11:30 PM - I discussed the patient's case and the above findings with Dr. Gale (hospitalist) who will consult on the patient for hospitalization.      HYDRATION: Based on the patient's presentation of Hypotension the patient was given IV fluids. IV Hydration was used because oral hydration was not adequate alone. Upon recheck following hydration, the patient was improved.    CRITICAL CARE  The very real  possibilty of a deterioration of this patient's condition required the highest level of my preparedness for sudden, emergent intervention.  I provided critical care services, which included medication orders, frequent reevaluations of the patient's condition and response to treatment, ordering and reviewing test results, and discussing the case with various consultants.  The critical care time associated with the care of the patient was 35 minutes. Review chart for interventions. This time is exclusive of any other billable procedures.     The patient was treated with Zofran for nausea.  Placed on a cardiac monitor to monitor for any arrhythmia associated with Zofran and QT prolongation.    The patient was treated with IV narcotics for pain control.  Placed on cardiac and blood pressure monitor to monitor for associated hypotension.  Also placed on pulse oximetry to monitor for hypoxia associated with medication administration/side effect.    DISPOSITION AND DISCUSSIONS    I have discussed management of the patient with the following physicians and YUDELKA's:  Dr. Manzanares (surgery) & Dr. Galvez (hospitalist)    Discussion of management with other [facility] or appropriate source(s): None     DISPOSITION:  Patient will be hospitalized by Dr. Gale  in critical condition.    FINAL DIAGNOSIS  1. Altered level of consciousness    2. Sepsis with encephalopathy and septic shock, due to unspecified organism (HCC)    3. Cholecystitis    4. History of single vessel coronary artery bypass    5. History of aortic valve replacement    6. Metabolic acidosis    7. Transaminitis         Eileen ALANIS (Binu), am scribing for, and in the presence of, Brian Vega M.D..    Electronically signed by: Eileen Santoyo (Jazminibjuan pablo), 8/23/2023    Brian ALANIS M.D. personally performed the services described in this documentation, as scribed by Eileen Santoyo in my presence, and it is both accurate and complete.      The note accurately reflects work  and decisions made by me.  Brian Vega M.D.  8/24/2023  12:01 AM   4 = No assist / stand by assistance

## 2023-10-01 PROBLEM — L30.8 PRURITIC DERMATITIS: Status: ACTIVE | Noted: 2022-02-14

## 2024-04-04 NOTE — H&P ADULT - NSHPPOADEEPVENOUSTHROMB_GEN_A_CORE
----- Message from Bonnie Rodarte sent at 4/2/2024  4:41 PM EDT -----  Regarding: pantoprazole 40 MG tablet  Contact: [phone]  I’m not able to refill this medication on my chart and the [facility] has requested it from you as well today. I will be out this week. My appointment isn’t until April 30. Please refill the 90 pills so it last 3 months it’s cheaper that way with my insurance   
----- Message from Bonnie Rodarte sent at 4/2/2024  4:41 PM EDT -----  Regarding: pantoprazole 40 MG tablet  Contact: [phone]  I’m not able to refill this medication on my chart and the [facility] has requested it from you as well today. I will be out this week. My appointment isn’t until April 30. Please refill the 90 pills so it last 3 months it’s cheaper that way with my insurance     PCP: Jc White MD    Last appt: 12/13/2023     Future Appointments   Date Time Provider Department Center   4/30/2024 11:15 AM Jc White MD [facility]       Requested Prescriptions     Pending Prescriptions Disp Refills    pantoprazole (PROTONIX) 40 MG tablet 30 tablet 0     Sig: Take 1 tablet by mouth daily       Prior labs and Blood pressures:  BP Readings from Last 3 Encounters:   01/09/24 134/62   10/30/23 136/82   08/28/23 110/62     Lab Results   Component Value Date/Time     10/30/2023 12:18 PM    K 4.2 10/30/2023 12:18 PM     10/30/2023 12:18 PM    CO2 24 10/30/2023 12:18 PM    BUN 23 10/30/2023 12:18 PM    GFRAA 44 09/21/2022 01:56 PM     Lab Results   Component Value Date/Time    ATI5MQHO 5.7 10/30/2023 11:47 AM     Lab Results   Component Value Date/Time    CHOL 188 10/30/2023 12:18 PM     10/30/2023 12:18 PM     No results found for: \"VITD3\", \"VD3RIA\"    No results found for: \"TSH\", \"TSH2\", \"TSH3\"   
----- Message from Bonnie Rodarte sent at 4/2/2024  5:04 PM EDT -----  Regarding: Yearly lung screening   Contact: [phone]  I thought I had a CT chest scan scheduled for April 11 at 8:30 to nine at [facility] but it didn’t appear on my chart and when I called they don’t have any record of it and they don’t see the order it went last year April 9 and I thought another order went in, but it was never received so they can’t even schedule it  
no

## 2024-05-10 NOTE — ED PROVIDER NOTE - NS ED NOTE AC HIGH RISK COUNTRIES
Chronic, controlled. Latest blood pressure and vitals reviewed-     Temp:  [97.7 °F (36.5 °C)-98.5 °F (36.9 °C)]   Pulse:  [53-65]   Resp:  [16-18]   BP: (137-168)/(88-98)   SpO2:  [96 %-100 %] .   Home meds for hypertension were reviewed and noted below.   Hypertension Medications               metoprolol tartrate (LOPRESSOR) 25 MG tablet Take 1 tablet (25 mg total) by mouth 2 (two) times daily.    olmesartan (BENICAR) 40 MG tablet Take 1 tablet (40 mg total) by mouth once daily.            While in the hospital, will manage blood pressure as follows; Adjust home antihypertensive regimen as follows- hold nephrotoxic medications, monitor BP    Will utilize p.r.n. blood pressure medication only if patient's blood pressure greater than 180/110 and he develops symptoms such as worsening chest pain or shortness of breath.   No

## 2024-05-22 ENCOUNTER — APPOINTMENT (OUTPATIENT)
Dept: SURGERY | Facility: CLINIC | Age: 64
End: 2024-05-22

## 2024-12-09 NOTE — ED ADULT TRIAGE NOTE - MEANS OF ARRIVAL
Pt clear for DC from case management standpoint. Discharging to home.  Follow up scheduled with PCP office and added to AVS.       12/09/24 1533   Final Note   Assessment Type Final Discharge Note   Anticipated Discharge Disposition Home   Hospital Resources/Appts/Education Provided Appointments scheduled and added to AVS        stretcher

## 2024-12-25 NOTE — ED ADULT TRIAGE NOTE - NS ED NOTE AC HIGH RISK COUNTRIES
Pt arrived via [facility] from [facility] for c/o witnessed fall. Pt awake, alert and oriented to self. Patient is on a memory care unit at facility so this is his baseline. Skin warm, dry, and color appropriate for ethnicity. Respirations easy and unlabored. Bed in lowest position, wheels locked, and both side rails up for safety. Pt instructed on how to use call light and left within reach. Pt denies further needs at this time.     No

## (undated) DEVICE — SOL INJ NS 0.9% 500ML 2 PORT

## (undated) DEVICE — FOLEY HOLDER STATLOCK 2 WAY ADULT

## (undated) DEVICE — IRRIGATOR BIO SHIELD

## (undated) DEVICE — CATH IV SAFE BC 22G X 1" (BLUE)

## (undated) DEVICE — TUBING SUCTION 20FT

## (undated) DEVICE — CATH IV SAFE BC 20G X 1.16" (PINK)

## (undated) DEVICE — SYR ALLIANCE II INFLATION 60ML

## (undated) DEVICE — BIOPSY FORCEP RADIAL JAW 4 STANDARD WITH NEEDLE

## (undated) DEVICE — SYR LUER LOK 50CC

## (undated) DEVICE — SUCTION YANKAUER NO CONTROL VENT

## (undated) DEVICE — BRUSH COLONOSCOPY CYTOLOGY

## (undated) DEVICE — PACK IV START WITH CHG

## (undated) DEVICE — BITE BLOCK ADULT 20 X 27MM (GREEN)

## (undated) DEVICE — TUBING SUCTION CONN 6FT STERILE

## (undated) DEVICE — FORCEP RADIAL JAW 4 JUMBO 2.8MM 3.2MM 240CM ORANGE DISP

## (undated) DEVICE — BALLOON US ENDO

## (undated) DEVICE — ELCTR GROUNDING PAD ADULT COVIDIEN

## (undated) DEVICE — SENSOR O2 FINGER ADULT

## (undated) DEVICE — TUBING IV SET GRAVITY 3Y 100" MACRO

## (undated) DEVICE — POLY TRAP ETRAP

## (undated) DEVICE — CLAMP BX HOT RAD JAW 3